# Patient Record
Sex: FEMALE | Race: WHITE | NOT HISPANIC OR LATINO | Employment: FULL TIME | URBAN - METROPOLITAN AREA
[De-identification: names, ages, dates, MRNs, and addresses within clinical notes are randomized per-mention and may not be internally consistent; named-entity substitution may affect disease eponyms.]

---

## 2017-04-06 ENCOUNTER — LAB (OUTPATIENT)
Dept: LAB | Facility: HOSPITAL | Age: 32
End: 2017-04-06

## 2017-04-06 ENCOUNTER — INITIAL PRENATAL (OUTPATIENT)
Dept: OBSTETRICS AND GYNECOLOGY | Facility: CLINIC | Age: 32
End: 2017-04-06

## 2017-04-06 VITALS
WEIGHT: 135.2 LBS | DIASTOLIC BLOOD PRESSURE: 68 MMHG | BODY MASS INDEX: 21.73 KG/M2 | SYSTOLIC BLOOD PRESSURE: 104 MMHG | HEIGHT: 66 IN

## 2017-04-06 DIAGNOSIS — Z3A.01 LESS THAN 8 WEEKS GESTATION OF PREGNANCY: ICD-10-CM

## 2017-04-06 DIAGNOSIS — Z3A.01 LESS THAN 8 WEEKS GESTATION OF PREGNANCY: Primary | ICD-10-CM

## 2017-04-06 LAB
ABO GROUP BLD: NORMAL
BASOPHILS # BLD AUTO: 0.03 10*3/MM3 (ref 0–0.2)
BASOPHILS NFR BLD AUTO: 0.4 % (ref 0–1)
BLD GP AB SCN SERPL QL: NEGATIVE
DEPRECATED RDW RBC AUTO: 43.5 FL (ref 37–54)
EOSINOPHIL # BLD AUTO: 0.15 10*3/MM3 (ref 0.1–0.3)
EOSINOPHIL NFR BLD AUTO: 2.1 % (ref 0–3)
ERYTHROCYTE [DISTWIDTH] IN BLOOD BY AUTOMATED COUNT: 12.6 % (ref 11.3–14.5)
EXTERNAL ABO GROUPING: NORMAL
EXTERNAL ANTIBODY SCREEN: NEGATIVE
EXTERNAL HEPATITIS B SURFACE ANTIGEN: NEGATIVE
EXTERNAL RH FACTOR: POSITIVE
EXTERNAL RUBELLA QUALITATIVE: NORMAL
EXTERNAL SYPHILIS RPR SCREEN: NORMAL
HBV SURFACE AG SERPL QL IA: NORMAL
HCT VFR BLD AUTO: 40.5 % (ref 34.5–44)
HGB BLD-MCNC: 13.7 G/DL (ref 11.5–15.5)
HIV1+2 AB SER QL: NORMAL
IMM GRANULOCYTES # BLD: 0.01 10*3/MM3 (ref 0–0.03)
IMM GRANULOCYTES NFR BLD: 0.1 % (ref 0–0.6)
LYMPHOCYTES # BLD AUTO: 2.39 10*3/MM3 (ref 0.6–4.8)
LYMPHOCYTES NFR BLD AUTO: 33.1 % (ref 24–44)
MCH RBC QN AUTO: 31.6 PG (ref 27–31)
MCHC RBC AUTO-ENTMCNC: 33.8 G/DL (ref 32–36)
MCV RBC AUTO: 93.5 FL (ref 80–99)
MONOCYTES # BLD AUTO: 0.61 10*3/MM3 (ref 0–1)
MONOCYTES NFR BLD AUTO: 8.5 % (ref 0–12)
NEUTROPHILS # BLD AUTO: 4.02 10*3/MM3 (ref 1.5–8.3)
NEUTROPHILS NFR BLD AUTO: 55.8 % (ref 41–71)
PLATELET # BLD AUTO: 274 10*3/MM3 (ref 150–450)
PMV BLD AUTO: 8.9 FL (ref 6–12)
RBC # BLD AUTO: 4.33 10*6/MM3 (ref 3.89–5.14)
RH BLD: POSITIVE
RUBV IGG SER QL: NORMAL
RUBV IGG SER-ACNC: 80.4 IU/ML
WBC NRBC COR # BLD: 7.21 10*3/MM3 (ref 3.5–10.8)

## 2017-04-06 PROCEDURE — 36415 COLL VENOUS BLD VENIPUNCTURE: CPT

## 2017-04-06 PROCEDURE — 80081 OBSTETRIC PANEL INC HIV TSTG: CPT | Performed by: OBSTETRICS & GYNECOLOGY

## 2017-04-06 PROCEDURE — 0501F PRENATAL FLOW SHEET: CPT | Performed by: OBSTETRICS & GYNECOLOGY

## 2017-04-07 LAB — RPR SER QL: NORMAL

## 2017-04-08 NOTE — PROGRESS NOTES
"Subjective   Liv Houser is a 31 y.o. female being seen today for her obstetrical visit. She is at 7w1d gestation. Patient reports no complaints. Fetal movement: None to date.    Menstrual History:  OB History      Para Term  AB TAB SAB Ectopic Multiple Living    1                  Menarche age: 13  Patient's last menstrual period was 2017 (exact date).       The following portions of the patient's history were reviewed and updated as appropriate: allergies, current medications, past family history, past medical history, past social history, past surgical history and problem list.    Review of Systems  A 10 point review of systems were negative     Objective     /68  Ht 66\" (167.6 cm)  Wt 135 lb 3.2 oz (61.3 kg)  LMP 2017 (Exact Date)  BMI 21.82 kg/m2  Uterine Size: Appropriate   Pelvic Exam:           Perineum: is normal                 Vulva: normal, Bartholin's, Urethra, [place]'s normal              Vagina:  normal mucosa, normal discharge               Cervix: no bleeding following Pap and nulliparous appearance               Uterus: normal size              Adnexa: normal adnexa      Bony Pelvis: gynecoid        Assessment      IUP at 7w1d       Plan     Problem list reviewed and updated.  Discussed the use of Vyvanse in pregnancy.  Patient with long history of ADHD and has been using the medicine for several years.  Will have patient stop the medicine for now and see how well she functions without it by the next visit.  If patient does not do well, will restart it.  Labs reviewed.  AFP3 discussed: requested.  Role of ultrasound in pregnancy discussed; fetal survey: requested.  Amniocentesis discussed: not indicated.  Follow up in 5 weeks.  Time 40 minutes.        MD TINY PerdomoA  "

## 2017-04-11 ENCOUNTER — TELEPHONE (OUTPATIENT)
Dept: OBSTETRICS AND GYNECOLOGY | Facility: CLINIC | Age: 32
End: 2017-04-11

## 2017-04-20 ENCOUNTER — TELEPHONE (OUTPATIENT)
Dept: OBSTETRICS AND GYNECOLOGY | Facility: CLINIC | Age: 32
End: 2017-04-20

## 2017-04-20 NOTE — TELEPHONE ENCOUNTER
----- Message from Elizabeth Bonilla sent at 4/20/2017  1:41 PM EDT -----  Wanted to ask Dr Montgomery if it is all right to start on welbutrin, her psychiatrist is going to prescribe it for her

## 2017-04-21 NOTE — TELEPHONE ENCOUNTER
Tried calling pt. Phone just rings.     Per Dr. Montgomery if she is comfortable with taking a C class drug he is ok with it. Concerns are  with draw but can discuss at next visit.

## 2017-05-11 ENCOUNTER — ROUTINE PRENATAL (OUTPATIENT)
Dept: OBSTETRICS AND GYNECOLOGY | Facility: CLINIC | Age: 32
End: 2017-05-11

## 2017-05-11 VITALS — BODY MASS INDEX: 21.95 KG/M2 | DIASTOLIC BLOOD PRESSURE: 74 MMHG | SYSTOLIC BLOOD PRESSURE: 108 MMHG | WEIGHT: 136 LBS

## 2017-05-11 DIAGNOSIS — Z3A.12 12 WEEKS GESTATION OF PREGNANCY: Primary | ICD-10-CM

## 2017-05-11 PROCEDURE — 0502F SUBSEQUENT PRENATAL CARE: CPT | Performed by: OBSTETRICS & GYNECOLOGY

## 2017-06-22 ENCOUNTER — ROUTINE PRENATAL (OUTPATIENT)
Dept: OBSTETRICS AND GYNECOLOGY | Facility: CLINIC | Age: 32
End: 2017-06-22

## 2017-06-22 VITALS — BODY MASS INDEX: 22.6 KG/M2 | SYSTOLIC BLOOD PRESSURE: 104 MMHG | DIASTOLIC BLOOD PRESSURE: 76 MMHG | WEIGHT: 140 LBS

## 2017-06-22 DIAGNOSIS — Z3A.18 18 WEEKS GESTATION OF PREGNANCY: Primary | ICD-10-CM

## 2017-06-22 PROCEDURE — 0502F SUBSEQUENT PRENATAL CARE: CPT | Performed by: OBSTETRICS & GYNECOLOGY

## 2017-06-22 RX ORDER — BUPROPION HYDROCHLORIDE 150 MG/1
150 TABLET ORAL DAILY
COMMUNITY
End: 2017-12-19 | Stop reason: ALTCHOICE

## 2017-06-22 NOTE — PROGRESS NOTES
Chief Complaint   Patient presents with   • Routine Prenatal Visit     No complaints       HPI: Liv is a  currently at 18w0d who today reports the following:  Contractions - No; Leaking - No; Vaginal bleeding -  No; Heartburn - No.    ROS:  GI: Nausea - No ; Constipation - No; Diarrhea - No    Neuro: Headache - No ; Visual change - No      EXAM:  Vitals: See prenatal flowsheet   Abdomen: See prenatal flowsheet   Urine glucose/protein: See prenatal flowsheet   Pelvic: See prenatal flowsheet     Lab Results   Component Value Date    ABO O 2017    RH Positive 2017    ABSCRN Negative 2017       MDM:  Impression: 1. Supervision of pregnancy   Tests done today: 1. U/S for anatomic screening - anatomy was not completely seen today   Topics discussed: 1. Continue with PNV's  2. Prenatal labs reviewed  3. none - she had no major complaints,questions or concerns   Tests next visit: U/S to complete the anatomic screening   Next visit: See prenatal flowsheet

## 2017-08-11 ENCOUNTER — ROUTINE PRENATAL (OUTPATIENT)
Dept: OBSTETRICS AND GYNECOLOGY | Facility: CLINIC | Age: 32
End: 2017-08-11

## 2017-08-11 VITALS — WEIGHT: 150 LBS | SYSTOLIC BLOOD PRESSURE: 124 MMHG | BODY MASS INDEX: 24.21 KG/M2 | DIASTOLIC BLOOD PRESSURE: 70 MMHG

## 2017-08-11 DIAGNOSIS — O44.22 PARTIAL PLACENTA PREVIA NOS OR WITHOUT HEMORRHAGE, SECOND TRIMESTER: Primary | ICD-10-CM

## 2017-08-11 PROCEDURE — 0502F SUBSEQUENT PRENATAL CARE: CPT | Performed by: OBSTETRICS & GYNECOLOGY

## 2017-08-11 NOTE — PROGRESS NOTES
Chief Complaint   Patient presents with   • Routine Prenatal Visit     Acid reflux       HPI: Liv is a  currently at 25w1d who today reports the following:  Contractions - No; Leaking - No; Vaginal bleeding -  No; Heartburn - No.    ROS:  GI: Nausea - No ; Constipation - No; Diarrhea - No    Neuro: Headache - No ; Visual change - No      EXAM:  Vitals: See prenatal flowsheet   Abdomen: See prenatal flowsheet   Urine glucose/protein: See prenatal flowsheet   Pelvic: See prenatal flowsheet     Lab Results   Component Value Date    ABO O 2017    RH Positive 2017    ABSCRN Negative 2017       MDM:  Impression: 1. Supervision of pregnancy   Tests done today: 1. U/S to complete the anatomic screening - Anatomy was seen completely today.  Placenta previa noted.   Topics discussed: 1. Continue with PNV's  2. Prenatal labs reviewed  3. Risks of placenta previa   Tests next visit: GCT  HgB  ABS  U/S for Placental evaluation   Next visit: See prenatal flowsheet

## 2017-09-01 ENCOUNTER — ROUTINE PRENATAL (OUTPATIENT)
Dept: OBSTETRICS AND GYNECOLOGY | Facility: CLINIC | Age: 32
End: 2017-09-01

## 2017-09-01 ENCOUNTER — LAB (OUTPATIENT)
Dept: LAB | Facility: HOSPITAL | Age: 32
End: 2017-09-01

## 2017-09-01 VITALS — WEIGHT: 148.6 LBS | DIASTOLIC BLOOD PRESSURE: 74 MMHG | BODY MASS INDEX: 23.98 KG/M2 | SYSTOLIC BLOOD PRESSURE: 106 MMHG

## 2017-09-01 DIAGNOSIS — O44.22 PARTIAL PLACENTA PREVIA NOS OR WITHOUT HEMORRHAGE, SECOND TRIMESTER: ICD-10-CM

## 2017-09-01 DIAGNOSIS — O44.41: Primary | ICD-10-CM

## 2017-09-01 LAB
BLD GP AB SCN SERPL QL: NEGATIVE
EXTERNAL GTT 1 HOUR: 104
GLUCOSE 1H P 100 G GLC PO SERPL-MCNC: 104 MG/DL (ref 65–199)
HCT VFR BLD AUTO: 36 % (ref 34.5–44)
HGB BLD-MCNC: 12.2 G/DL (ref 11.5–15.5)
TSH SERPL DL<=0.05 MIU/L-ACNC: 3.39 MIU/ML (ref 0.35–5.35)

## 2017-09-01 PROCEDURE — 36415 COLL VENOUS BLD VENIPUNCTURE: CPT | Performed by: OBSTETRICS & GYNECOLOGY

## 2017-09-01 PROCEDURE — 86850 RBC ANTIBODY SCREEN: CPT | Performed by: OBSTETRICS & GYNECOLOGY

## 2017-09-01 PROCEDURE — 0502F SUBSEQUENT PRENATAL CARE: CPT | Performed by: OBSTETRICS & GYNECOLOGY

## 2017-09-01 PROCEDURE — 85018 HEMOGLOBIN: CPT | Performed by: OBSTETRICS & GYNECOLOGY

## 2017-09-01 PROCEDURE — 84443 ASSAY THYROID STIM HORMONE: CPT | Performed by: OBSTETRICS & GYNECOLOGY

## 2017-09-01 PROCEDURE — 82950 GLUCOSE TEST: CPT | Performed by: OBSTETRICS & GYNECOLOGY

## 2017-09-01 PROCEDURE — 85014 HEMATOCRIT: CPT | Performed by: OBSTETRICS & GYNECOLOGY

## 2017-09-03 ENCOUNTER — RESULTS ENCOUNTER (OUTPATIENT)
Dept: OBSTETRICS AND GYNECOLOGY | Facility: CLINIC | Age: 32
End: 2017-09-03

## 2017-09-03 DIAGNOSIS — O44.22 PARTIAL PLACENTA PREVIA NOS OR WITHOUT HEMORRHAGE, SECOND TRIMESTER: ICD-10-CM

## 2017-09-07 ENCOUNTER — ROUTINE PRENATAL (OUTPATIENT)
Dept: OBSTETRICS AND GYNECOLOGY | Facility: CLINIC | Age: 32
End: 2017-09-07

## 2017-09-07 VITALS — SYSTOLIC BLOOD PRESSURE: 114 MMHG | BODY MASS INDEX: 24.69 KG/M2 | WEIGHT: 153 LBS | DIASTOLIC BLOOD PRESSURE: 70 MMHG

## 2017-09-07 DIAGNOSIS — O44.43 LOW LYING PLACENTA NOS OR WITHOUT HEMORRHAGE, THIRD TRIMESTER: Primary | ICD-10-CM

## 2017-09-07 PROCEDURE — 0502F SUBSEQUENT PRENATAL CARE: CPT | Performed by: OBSTETRICS & GYNECOLOGY

## 2017-09-15 NOTE — PROGRESS NOTES
Chief Complaint   Patient presents with   • Routine Prenatal Visit     No complaints       HPI: Liv is a  currently at 30w0d who today reports the following:  Contractions - No; Leaking - No; Vaginal bleeding -  No; Heartburn - No.    ROS:  GI: Nausea - No ; Constipation - No; Diarrhea - No    Neuro: Headache - No ; Visual change - No      EXAM:  Vitals: See prenatal flowsheet   Abdomen: See prenatal flowsheet   Urine glucose/protein: See prenatal flowsheet   Pelvic: See prenatal flowsheet     Lab Results   Component Value Date    ABO O 2017    RH Positive 2017    ABSCRN Negative 2017       MDM:  Impression: 1. Supervision of low risk pregnancy  2. Low-lying placenta   Tests done today: 1. none   Topics discussed: 1. Continue with PNV's  2. Prenatal labs reviewed  3. none - she had no major complaints,questions or concerns   Tests next visit: none   Next visit: See prenatal flowsheet

## 2017-09-15 NOTE — PROGRESS NOTES
Chief Complaint   Patient presents with   • Routine Prenatal Visit     No complaints       HPI: Liv is a  currently at 30w0d who today reports the following:  Contractions - No; Leaking - No; Vaginal bleeding -  No; Heartburn - No.    ROS:  GI: Nausea - No ; Constipation - No; Diarrhea - No    Neuro: Headache - No ; Visual change - No      EXAM:  Vitals: See prenatal flowsheet   Abdomen: See prenatal flowsheet   Urine glucose/protein: See prenatal flowsheet   Pelvic: See prenatal flowsheet     Lab Results   Component Value Date    ABO O 2017    RH Positive 2017    ABSCRN Negative 2017       MDM:  Impression: 1. Supervision of low risk pregnancy  2. Low-lying placenta   Tests done today: 1. U/S for Placental location   Topics discussed: 1. Continue with PNV's  2. Prenatal labs reviewed  3. none - she had no major complaints,questions or concerns   Tests next visit: none   Next visit: See prenatal flowsheet

## 2017-09-21 ENCOUNTER — ROUTINE PRENATAL (OUTPATIENT)
Dept: OBSTETRICS AND GYNECOLOGY | Facility: CLINIC | Age: 32
End: 2017-09-21

## 2017-09-21 VITALS — DIASTOLIC BLOOD PRESSURE: 80 MMHG | BODY MASS INDEX: 25.02 KG/M2 | WEIGHT: 155 LBS | SYSTOLIC BLOOD PRESSURE: 116 MMHG

## 2017-09-21 DIAGNOSIS — Z34.03 SUPERVISION OF NORMAL FIRST PREGNANCY IN THIRD TRIMESTER: Primary | ICD-10-CM

## 2017-09-21 PROCEDURE — 0502F SUBSEQUENT PRENATAL CARE: CPT | Performed by: OBSTETRICS & GYNECOLOGY

## 2017-09-21 NOTE — PROGRESS NOTES
Chief Complaint   Patient presents with   • Routine Prenatal Visit     No complaints       HPI: Liv is a  currently at 31w0d who today reports the following:  Contractions - No; Leaking - No; Vaginal bleeding -  No; Swelling of extremities - No.    ROS:  GI: Nausea - No; Constipation - No; Diarrhea - No    Neuro: Headache - No; Visual change - No      EXAM:  Vitals: See prenatal flowsheet   Abdomen: See prenatal flowsheet   Urine glucose/protein: See prenatal flowsheet   Pelvic: See prenatal flowsheet   MDM:  Impression: 1. Supervision of low risk pregnancy   Tests done today: 1. none   Topics discussed: 1. Continue with PNV's  2. Prenatal labs reviewed  3. none - she had no major complaints,questions or concerns   Tests next visit: none   Next visit: See prenatal flowsheet

## 2017-10-06 ENCOUNTER — ROUTINE PRENATAL (OUTPATIENT)
Dept: OBSTETRICS AND GYNECOLOGY | Facility: CLINIC | Age: 32
End: 2017-10-06

## 2017-10-06 VITALS — DIASTOLIC BLOOD PRESSURE: 70 MMHG | BODY MASS INDEX: 25.24 KG/M2 | WEIGHT: 156.4 LBS | SYSTOLIC BLOOD PRESSURE: 104 MMHG

## 2017-10-06 DIAGNOSIS — Z34.03 SUPERVISION OF NORMAL FIRST PREGNANCY IN THIRD TRIMESTER: Primary | ICD-10-CM

## 2017-10-06 PROCEDURE — 0502F SUBSEQUENT PRENATAL CARE: CPT | Performed by: OBSTETRICS & GYNECOLOGY

## 2017-10-06 NOTE — PROGRESS NOTES
Chief Complaint   Patient presents with   • Routine Prenatal Visit     No complaints       HPI: Liv is a  currently at 33w1d who today reports the following:  Contractions - No; Leaking - No; Vaginal bleeding -  No; Swelling of extremities - No.    ROS:  GI: Nausea - No; Constipation - No; Diarrhea - No    Neuro: Headache - No; Visual change - No      EXAM:  Vitals: See prenatal flowsheet   Abdomen: See prenatal flowsheet   Urine glucose/protein: See prenatal flowsheet   Pelvic: See prenatal flowsheet   MDM:  Impression: 1. Supervision of low risk pregnancy   Tests done today: 1. none   Topics discussed: 1. Continue with PNV's  2. Prenatal labs reviewed  3. none - she had no major complaints,questions or concerns   Tests next visit: GBS testing  U/S for EFW   Next visit: See prenatal flowsheet

## 2017-10-20 ENCOUNTER — ROUTINE PRENATAL (OUTPATIENT)
Dept: OBSTETRICS AND GYNECOLOGY | Facility: CLINIC | Age: 32
End: 2017-10-20

## 2017-10-20 VITALS — DIASTOLIC BLOOD PRESSURE: 80 MMHG | WEIGHT: 159.4 LBS | BODY MASS INDEX: 25.73 KG/M2 | SYSTOLIC BLOOD PRESSURE: 114 MMHG

## 2017-10-20 DIAGNOSIS — Z3A.35 35 WEEKS GESTATION OF PREGNANCY: Primary | ICD-10-CM

## 2017-10-20 LAB — EXTERNAL GROUP B STREP ANTIGEN: POSITIVE

## 2017-10-20 PROCEDURE — 0502F SUBSEQUENT PRENATAL CARE: CPT | Performed by: OBSTETRICS & GYNECOLOGY

## 2017-10-20 NOTE — PROGRESS NOTES
Chief Complaint   Patient presents with   • Routine Prenatal Visit     No complaints       HPI: Liv is a  currently at 35w1d who today reports the following:  Contractions - No; Leaking - No; Vaginal bleeding -  No; Swelling of extremities - No.    ROS:  GI: Nausea - No; Constipation - No; Diarrhea - No    Neuro: Headache - No; Visual change - No      EXAM:  Vitals: See prenatal flowsheet   Abdomen: See prenatal flowsheet   Urine glucose/protein: See prenatal flowsheet   Pelvic: See prenatal flowsheet   MDM:  Impression: 1. Supervision of low risk pregnancy   Tests done today: 1. none   Topics discussed: 1. Continue with PNV's  2. Prenatal labs reviewed  3. none - she had no major complaints,questions or concerns   Tests next visit: none   Next visit: See prenatal flowsheet

## 2017-10-27 ENCOUNTER — ROUTINE PRENATAL (OUTPATIENT)
Dept: OBSTETRICS AND GYNECOLOGY | Facility: CLINIC | Age: 32
End: 2017-10-27

## 2017-10-27 VITALS — WEIGHT: 160.8 LBS | DIASTOLIC BLOOD PRESSURE: 70 MMHG | SYSTOLIC BLOOD PRESSURE: 114 MMHG | BODY MASS INDEX: 25.95 KG/M2

## 2017-10-27 PROCEDURE — 0502F SUBSEQUENT PRENATAL CARE: CPT | Performed by: OBSTETRICS & GYNECOLOGY

## 2017-11-03 ENCOUNTER — ROUTINE PRENATAL (OUTPATIENT)
Dept: OBSTETRICS AND GYNECOLOGY | Facility: CLINIC | Age: 32
End: 2017-11-03

## 2017-11-03 VITALS — SYSTOLIC BLOOD PRESSURE: 112 MMHG | BODY MASS INDEX: 25.7 KG/M2 | DIASTOLIC BLOOD PRESSURE: 80 MMHG | WEIGHT: 159.2 LBS

## 2017-11-03 PROCEDURE — 0502F SUBSEQUENT PRENATAL CARE: CPT | Performed by: OBSTETRICS & GYNECOLOGY

## 2017-11-03 NOTE — PROGRESS NOTES
Chief Complaint   Patient presents with   • Routine Prenatal Visit     Contractions       HPI: Liv is a  currently at 37w1d who today reports the following:  Contractions - YES - but less than 4/hour AND no associated change in vaginal discharge; Leaking - No; Vaginal bleeding -  No; Swelling of extremities - No.    ROS:  GI: Nausea - No; Constipation - No; Diarrhea - No    Neuro: Headache - No; Visual change - No      EXAM:  Vitals: See prenatal flowsheet   Abdomen: See prenatal flowsheet   Urine glucose/protein: See prenatal flowsheet   Pelvic: See prenatal flowsheet   MDM:  Impression: 1. Supervision of low risk pregnancy   Tests done today: 1. none   Topics discussed: 1. Continue with PNV's  2. Prenatal labs reviewed  3. none - she had no major complaints,questions or concerns   Tests next visit: none   Next visit: See prenatal flowsheet

## 2017-11-04 ENCOUNTER — ANESTHESIA EVENT (OUTPATIENT)
Dept: LABOR AND DELIVERY | Facility: HOSPITAL | Age: 32
End: 2017-11-04

## 2017-11-04 ENCOUNTER — ANESTHESIA (OUTPATIENT)
Dept: LABOR AND DELIVERY | Facility: HOSPITAL | Age: 32
End: 2017-11-04

## 2017-11-04 ENCOUNTER — HOSPITAL ENCOUNTER (INPATIENT)
Facility: HOSPITAL | Age: 32
LOS: 3 days | Discharge: HOME | End: 2017-11-07
Attending: OBSTETRICS & GYNECOLOGY | Admitting: OBSTETRICS & GYNECOLOGY

## 2017-11-04 LAB
ABO GROUP BLD: NORMAL
AMPHET+METHAMPHET UR QL: POSITIVE
AMPHETAMINES UR QL: NEGATIVE
BARBITURATES UR QL SCN: NEGATIVE
BENZODIAZ UR QL SCN: NEGATIVE
BLD GP AB SCN SERPL QL: NEGATIVE
BUPRENORPHINE SERPL-MCNC: NEGATIVE NG/ML
CANNABINOIDS SERPL QL: NEGATIVE
COCAINE UR QL: NEGATIVE
DEPRECATED RDW RBC AUTO: 39 FL (ref 37–54)
ERYTHROCYTE [DISTWIDTH] IN BLOOD BY AUTOMATED COUNT: 12.2 % (ref 11.3–14.5)
HCT VFR BLD AUTO: 33.5 % (ref 34.5–44)
HGB BLD-MCNC: 11.3 G/DL (ref 11.5–15.5)
MCH RBC QN AUTO: 30.1 PG (ref 27–31)
MCHC RBC AUTO-ENTMCNC: 33.7 G/DL (ref 32–36)
MCV RBC AUTO: 89.1 FL (ref 80–99)
METHADONE UR QL SCN: NEGATIVE
OPIATES UR QL: NEGATIVE
OXYCODONE UR QL SCN: NEGATIVE
PCP UR QL SCN: NEGATIVE
PLATELET # BLD AUTO: 236 10*3/MM3 (ref 150–450)
PMV BLD AUTO: 8.8 FL (ref 6–12)
PROPOXYPH UR QL: NEGATIVE
RBC # BLD AUTO: 3.76 10*6/MM3 (ref 3.89–5.14)
RH BLD: POSITIVE
TRICYCLICS UR QL SCN: NEGATIVE
WBC NRBC COR # BLD: 9.3 10*3/MM3 (ref 3.5–10.8)

## 2017-11-04 PROCEDURE — S0260 H&P FOR SURGERY: HCPCS | Performed by: OBSTETRICS & GYNECOLOGY

## 2017-11-04 PROCEDURE — 86900 BLOOD TYPING SEROLOGIC ABO: CPT | Performed by: OBSTETRICS & GYNECOLOGY

## 2017-11-04 PROCEDURE — 25010000002 METOCLOPRAMIDE PER 10 MG: Performed by: ANESTHESIOLOGY

## 2017-11-04 PROCEDURE — 25010000003 MORPHINE PER 10 MG: Performed by: ANESTHESIOLOGY

## 2017-11-04 PROCEDURE — 80306 DRUG TEST PRSMV INSTRMNT: CPT | Performed by: OBSTETRICS & GYNECOLOGY

## 2017-11-04 PROCEDURE — 85027 COMPLETE CBC AUTOMATED: CPT | Performed by: OBSTETRICS & GYNECOLOGY

## 2017-11-04 PROCEDURE — 86850 RBC ANTIBODY SCREEN: CPT | Performed by: OBSTETRICS & GYNECOLOGY

## 2017-11-04 PROCEDURE — 86901 BLOOD TYPING SEROLOGIC RH(D): CPT | Performed by: OBSTETRICS & GYNECOLOGY

## 2017-11-04 PROCEDURE — 25010000002 GENTAMICIN PER 80 MG: Performed by: OBSTETRICS & GYNECOLOGY

## 2017-11-04 PROCEDURE — G0480 DRUG TEST DEF 1-7 CLASSES: HCPCS | Performed by: OBSTETRICS & GYNECOLOGY

## 2017-11-04 PROCEDURE — 25010000002 ONDANSETRON PER 1 MG: Performed by: ANESTHESIOLOGY

## 2017-11-04 RX ORDER — TRISODIUM CITRATE DIHYDRATE AND CITRIC ACID MONOHYDRATE 500; 334 MG/5ML; MG/5ML
30 SOLUTION ORAL ONCE
Status: COMPLETED | OUTPATIENT
Start: 2017-11-04 | End: 2017-11-04

## 2017-11-04 RX ORDER — FAMOTIDINE 10 MG/ML
20 INJECTION, SOLUTION INTRAVENOUS ONCE
Status: COMPLETED | OUTPATIENT
Start: 2017-11-04 | End: 2017-11-04

## 2017-11-04 RX ORDER — METOCLOPRAMIDE HYDROCHLORIDE 5 MG/ML
10 INJECTION INTRAMUSCULAR; INTRAVENOUS ONCE
Status: COMPLETED | OUTPATIENT
Start: 2017-11-04 | End: 2017-11-04

## 2017-11-04 RX ORDER — CARBOPROST TROMETHAMINE 250 UG/ML
250 INJECTION, SOLUTION INTRAMUSCULAR AS NEEDED
Status: CANCELLED | OUTPATIENT
Start: 2017-11-04

## 2017-11-04 RX ORDER — METHYLERGONOVINE MALEATE 0.2 MG/ML
200 INJECTION INTRAVENOUS AS NEEDED
Status: CANCELLED | OUTPATIENT
Start: 2017-11-04

## 2017-11-04 RX ORDER — OXYTOCIN 10 [USP'U]/ML
INJECTION, SOLUTION INTRAMUSCULAR; INTRAVENOUS AS NEEDED
Status: DISCONTINUED | OUTPATIENT
Start: 2017-11-04 | End: 2017-11-05 | Stop reason: SURG

## 2017-11-04 RX ORDER — SODIUM CHLORIDE, SODIUM LACTATE, POTASSIUM CHLORIDE, CALCIUM CHLORIDE 600; 310; 30; 20 MG/100ML; MG/100ML; MG/100ML; MG/100ML
125 INJECTION, SOLUTION INTRAVENOUS CONTINUOUS
Status: DISCONTINUED | OUTPATIENT
Start: 2017-11-04 | End: 2017-11-07 | Stop reason: HOSPADM

## 2017-11-04 RX ORDER — CLINDAMYCIN PHOSPHATE 900 MG/50ML
900 INJECTION INTRAVENOUS ONCE
Status: COMPLETED | OUTPATIENT
Start: 2017-11-04 | End: 2017-11-04

## 2017-11-04 RX ORDER — MISOPROSTOL 200 UG/1
800 TABLET ORAL AS NEEDED
Status: CANCELLED | OUTPATIENT
Start: 2017-11-04

## 2017-11-04 RX ORDER — OXYTOCIN/RINGER'S LACTATE 20/1000 ML
999 PLASTIC BAG, INJECTION (ML) INTRAVENOUS ONCE
Status: CANCELLED | OUTPATIENT
Start: 2017-11-04 | End: 2017-11-04

## 2017-11-04 RX ORDER — SODIUM CHLORIDE 0.9 % (FLUSH) 0.9 %
1-10 SYRINGE (ML) INJECTION AS NEEDED
Status: DISCONTINUED | OUTPATIENT
Start: 2017-11-04 | End: 2017-11-05 | Stop reason: HOSPADM

## 2017-11-04 RX ORDER — OXYTOCIN/RINGER'S LACTATE 20/1000 ML
125 PLASTIC BAG, INJECTION (ML) INTRAVENOUS CONTINUOUS PRN
Status: CANCELLED | OUTPATIENT
Start: 2017-11-04 | End: 2017-11-05

## 2017-11-04 RX ORDER — GENTAMICIN SULFATE 60 MG/50ML
2 INJECTION, SOLUTION INTRAVENOUS ONCE
Status: COMPLETED | OUTPATIENT
Start: 2017-11-04 | End: 2017-11-04

## 2017-11-04 RX ORDER — MORPHINE SULFATE 0.5 MG/ML
INJECTION, SOLUTION EPIDURAL; INTRATHECAL; INTRAVENOUS AS NEEDED
Status: DISCONTINUED | OUTPATIENT
Start: 2017-11-04 | End: 2017-11-05 | Stop reason: SURG

## 2017-11-04 RX ORDER — BUPIVACAINE HYDROCHLORIDE 7.5 MG/ML
INJECTION, SOLUTION INTRASPINAL AS NEEDED
Status: DISCONTINUED | OUTPATIENT
Start: 2017-11-04 | End: 2017-11-05 | Stop reason: SURG

## 2017-11-04 RX ORDER — FENTANYL CITRATE 50 UG/ML
INJECTION, SOLUTION INTRAMUSCULAR; INTRAVENOUS AS NEEDED
Status: DISCONTINUED | OUTPATIENT
Start: 2017-11-04 | End: 2017-11-05 | Stop reason: SURG

## 2017-11-04 RX ORDER — ONDANSETRON 2 MG/ML
INJECTION INTRAMUSCULAR; INTRAVENOUS AS NEEDED
Status: DISCONTINUED | OUTPATIENT
Start: 2017-11-04 | End: 2017-11-05 | Stop reason: SURG

## 2017-11-04 RX ORDER — LIDOCAINE HYDROCHLORIDE 10 MG/ML
5 INJECTION, SOLUTION INFILTRATION; PERINEURAL AS NEEDED
Status: DISCONTINUED | OUTPATIENT
Start: 2017-11-04 | End: 2017-11-05 | Stop reason: HOSPADM

## 2017-11-04 RX ADMIN — ONDANSETRON 4 MG: 2 INJECTION INTRAMUSCULAR; INTRAVENOUS at 23:35

## 2017-11-04 RX ADMIN — OXYTOCIN 40 UNITS: 10 INJECTION, SOLUTION INTRAMUSCULAR; INTRAVENOUS at 23:57

## 2017-11-04 RX ADMIN — METOCLOPRAMIDE 10 MG: 5 INJECTION, SOLUTION INTRAMUSCULAR; INTRAVENOUS at 22:11

## 2017-11-04 RX ADMIN — CLINDAMYCIN PHOSPHATE 900 MG: 18 INJECTION, SOLUTION INTRAVENOUS at 21:43

## 2017-11-04 RX ADMIN — BUPIVACAINE HYDROCHLORIDE IN DEXTROSE 1.6 ML: 7.5 INJECTION, SOLUTION SUBARACHNOID at 23:33

## 2017-11-04 RX ADMIN — SODIUM CHLORIDE, POTASSIUM CHLORIDE, SODIUM LACTATE AND CALCIUM CHLORIDE 125 ML/HR: 600; 310; 30; 20 INJECTION, SOLUTION INTRAVENOUS at 21:12

## 2017-11-04 RX ADMIN — FAMOTIDINE 20 MG: 10 INJECTION, SOLUTION INTRAVENOUS at 22:10

## 2017-11-04 RX ADMIN — FENTANYL CITRATE 15 MCG: 50 INJECTION, SOLUTION INTRAMUSCULAR; INTRAVENOUS at 23:33

## 2017-11-04 RX ADMIN — SODIUM CHLORIDE, POTASSIUM CHLORIDE, SODIUM LACTATE AND CALCIUM CHLORIDE: 600; 310; 30; 20 INJECTION, SOLUTION INTRAVENOUS at 23:43

## 2017-11-04 RX ADMIN — MORPHINE SULFATE 0.15 MG: 0.5 INJECTION, SOLUTION EPIDURAL; INTRATHECAL; INTRAVENOUS at 23:33

## 2017-11-04 RX ADMIN — SODIUM CITRATE AND CITRIC ACID MONOHYDRATE 30 ML: 500; 334 SOLUTION ORAL at 23:18

## 2017-11-04 RX ADMIN — SODIUM CHLORIDE, POTASSIUM CHLORIDE, SODIUM LACTATE AND CALCIUM CHLORIDE 1500 ML: 600; 310; 30; 20 INJECTION, SOLUTION INTRAVENOUS at 20:55

## 2017-11-04 RX ADMIN — GENTAMICIN SULFATE 120 MG: 60 INJECTION, SOLUTION INTRAVENOUS at 22:17

## 2017-11-05 PROCEDURE — 59510 CESAREAN DELIVERY: CPT | Performed by: OBSTETRICS & GYNECOLOGY

## 2017-11-05 PROCEDURE — 59025 FETAL NON-STRESS TEST: CPT

## 2017-11-05 PROCEDURE — 25010000002 FENTANYL CITRATE (PF) 100 MCG/2ML SOLUTION: Performed by: ANESTHESIOLOGY

## 2017-11-05 RX ORDER — ACETAMINOPHEN 325 MG/1
650 TABLET ORAL ONCE AS NEEDED
Status: DISCONTINUED | OUTPATIENT
Start: 2017-11-05 | End: 2017-11-07 | Stop reason: HOSPADM

## 2017-11-05 RX ORDER — ONDANSETRON 2 MG/ML
4 INJECTION INTRAMUSCULAR; INTRAVENOUS EVERY 6 HOURS PRN
Status: DISCONTINUED | OUTPATIENT
Start: 2017-11-05 | End: 2017-11-07 | Stop reason: HOSPADM

## 2017-11-05 RX ORDER — PROMETHAZINE HYDROCHLORIDE 12.5 MG/1
12.5 SUPPOSITORY RECTAL EVERY 6 HOURS PRN
Status: DISCONTINUED | OUTPATIENT
Start: 2017-11-05 | End: 2017-11-07 | Stop reason: HOSPADM

## 2017-11-05 RX ORDER — SIMETHICONE 80 MG
80 TABLET,CHEWABLE ORAL 4 TIMES DAILY PRN
Status: DISCONTINUED | OUTPATIENT
Start: 2017-11-05 | End: 2017-11-07 | Stop reason: HOSPADM

## 2017-11-05 RX ORDER — LANOLIN 100 %
OINTMENT (GRAM) TOPICAL
Status: DISCONTINUED | OUTPATIENT
Start: 2017-11-05 | End: 2017-11-07 | Stop reason: HOSPADM

## 2017-11-05 RX ORDER — DOCUSATE SODIUM 100 MG/1
100 CAPSULE, LIQUID FILLED ORAL 2 TIMES DAILY PRN
Status: DISCONTINUED | OUTPATIENT
Start: 2017-11-05 | End: 2017-11-07 | Stop reason: HOSPADM

## 2017-11-05 RX ORDER — DIPHENHYDRAMINE HCL 25 MG
25 CAPSULE ORAL EVERY 4 HOURS PRN
Status: DISCONTINUED | OUTPATIENT
Start: 2017-11-05 | End: 2017-11-07 | Stop reason: HOSPADM

## 2017-11-05 RX ORDER — HYDROCODONE BITARTRATE AND ACETAMINOPHEN 7.5; 325 MG/1; MG/1
1 TABLET ORAL EVERY 4 HOURS PRN
Status: DISCONTINUED | OUTPATIENT
Start: 2017-11-05 | End: 2017-11-07 | Stop reason: HOSPADM

## 2017-11-05 RX ORDER — NALOXONE HCL 0.4 MG/ML
0.4 VIAL (ML) INJECTION
Status: ACTIVE | OUTPATIENT
Start: 2017-11-05 | End: 2017-11-06

## 2017-11-05 RX ORDER — OXYCODONE HYDROCHLORIDE AND ACETAMINOPHEN 5; 325 MG/1; MG/1
2 TABLET ORAL ONCE AS NEEDED
Status: ACTIVE | OUTPATIENT
Start: 2017-11-05 | End: 2017-11-06

## 2017-11-05 RX ORDER — KETOROLAC TROMETHAMINE 30 MG/ML
30 INJECTION, SOLUTION INTRAMUSCULAR; INTRAVENOUS EVERY 6 HOURS
Status: DISCONTINUED | OUTPATIENT
Start: 2017-11-05 | End: 2017-11-06

## 2017-11-05 RX ORDER — ONDANSETRON 4 MG/1
4 TABLET, FILM COATED ORAL EVERY 6 HOURS PRN
Status: DISCONTINUED | OUTPATIENT
Start: 2017-11-05 | End: 2017-11-07 | Stop reason: HOSPADM

## 2017-11-05 RX ORDER — PROMETHAZINE HYDROCHLORIDE 25 MG/ML
12.5 INJECTION, SOLUTION INTRAMUSCULAR; INTRAVENOUS EVERY 6 HOURS PRN
Status: DISCONTINUED | OUTPATIENT
Start: 2017-11-05 | End: 2017-11-07 | Stop reason: HOSPADM

## 2017-11-05 RX ORDER — IBUPROFEN 600 MG/1
600 TABLET ORAL ONCE AS NEEDED
Status: COMPLETED | OUTPATIENT
Start: 2017-11-05 | End: 2017-11-05

## 2017-11-05 RX ORDER — DEXTROSE, SODIUM CHLORIDE, SODIUM LACTATE, POTASSIUM CHLORIDE, AND CALCIUM CHLORIDE 5; .6; .31; .03; .02 G/100ML; G/100ML; G/100ML; G/100ML; G/100ML
125 INJECTION, SOLUTION INTRAVENOUS CONTINUOUS
Status: DISCONTINUED | OUTPATIENT
Start: 2017-11-05 | End: 2017-11-07 | Stop reason: HOSPADM

## 2017-11-05 RX ORDER — IBUPROFEN 600 MG/1
600 TABLET ORAL EVERY 6 HOURS PRN
Status: DISCONTINUED | OUTPATIENT
Start: 2017-11-05 | End: 2017-11-07 | Stop reason: HOSPADM

## 2017-11-05 RX ORDER — HYDROXYZINE HYDROCHLORIDE 25 MG/1
50 TABLET, FILM COATED ORAL EVERY 6 HOURS PRN
Status: DISCONTINUED | OUTPATIENT
Start: 2017-11-05 | End: 2017-11-07 | Stop reason: HOSPADM

## 2017-11-05 RX ORDER — METOCLOPRAMIDE HYDROCHLORIDE 5 MG/ML
10 INJECTION INTRAMUSCULAR; INTRAVENOUS ONCE AS NEEDED
Status: DISCONTINUED | OUTPATIENT
Start: 2017-11-05 | End: 2017-11-07 | Stop reason: HOSPADM

## 2017-11-05 RX ORDER — HYDROCODONE BITARTRATE AND ACETAMINOPHEN 10; 325 MG/1; MG/1
1 TABLET ORAL EVERY 4 HOURS PRN
Status: DISCONTINUED | OUTPATIENT
Start: 2017-11-05 | End: 2017-11-07 | Stop reason: HOSPADM

## 2017-11-05 RX ORDER — HYDROMORPHONE HYDROCHLORIDE 1 MG/ML
0.5 INJECTION, SOLUTION INTRAMUSCULAR; INTRAVENOUS; SUBCUTANEOUS
Status: ACTIVE | OUTPATIENT
Start: 2017-11-05 | End: 2017-11-06

## 2017-11-05 RX ORDER — PROMETHAZINE HYDROCHLORIDE 25 MG/1
25 TABLET ORAL EVERY 6 HOURS PRN
Status: DISCONTINUED | OUTPATIENT
Start: 2017-11-05 | End: 2017-11-07 | Stop reason: HOSPADM

## 2017-11-05 RX ORDER — DIPHENHYDRAMINE HYDROCHLORIDE 50 MG/ML
25 INJECTION INTRAMUSCULAR; INTRAVENOUS EVERY 4 HOURS PRN
Status: DISCONTINUED | OUTPATIENT
Start: 2017-11-05 | End: 2017-11-07 | Stop reason: HOSPADM

## 2017-11-05 RX ORDER — MORPHINE SULFATE 2 MG/ML
2 INJECTION, SOLUTION INTRAMUSCULAR; INTRAVENOUS
Status: ACTIVE | OUTPATIENT
Start: 2017-11-05 | End: 2017-11-06

## 2017-11-05 RX ORDER — ONDANSETRON 2 MG/ML
4 INJECTION INTRAMUSCULAR; INTRAVENOUS ONCE
Status: DISCONTINUED | OUTPATIENT
Start: 2017-11-05 | End: 2017-11-07 | Stop reason: HOSPADM

## 2017-11-05 RX ADMIN — OXYTOCIN 40 UNITS: 10 INJECTION, SOLUTION INTRAMUSCULAR; INTRAVENOUS at 00:15

## 2017-11-05 RX ADMIN — SIMETHICONE CHEW TAB 80 MG 80 MG: 80 TABLET ORAL at 11:51

## 2017-11-05 RX ADMIN — IBUPROFEN 600 MG: 600 TABLET, FILM COATED ORAL at 17:49

## 2017-11-05 RX ADMIN — SIMETHICONE CHEW TAB 80 MG 80 MG: 80 TABLET ORAL at 07:33

## 2017-11-05 RX ADMIN — IBUPROFEN 600 MG: 600 TABLET ORAL at 06:19

## 2017-11-05 RX ADMIN — HYDROCODONE BITARTRATE AND ACETAMINOPHEN 1 TABLET: 7.5; 325 TABLET ORAL at 11:51

## 2017-11-05 RX ADMIN — HYDROCODONE BITARTRATE AND ACETAMINOPHEN 1 TABLET: 7.5; 325 TABLET ORAL at 07:33

## 2017-11-05 RX ADMIN — SIMETHICONE CHEW TAB 80 MG 80 MG: 80 TABLET ORAL at 22:22

## 2017-11-05 RX ADMIN — FENTANYL CITRATE 50 MCG: 50 INJECTION, SOLUTION INTRAMUSCULAR; INTRAVENOUS at 00:10

## 2017-11-05 RX ADMIN — SIMETHICONE CHEW TAB 80 MG 80 MG: 80 TABLET ORAL at 18:05

## 2017-11-05 RX ADMIN — IBUPROFEN 600 MG: 600 TABLET ORAL at 11:59

## 2017-11-05 RX ADMIN — HYDROCODONE BITARTRATE AND ACETAMINOPHEN 1 TABLET: 7.5; 325 TABLET ORAL at 18:05

## 2017-11-05 RX ADMIN — HYDROCODONE BITARTRATE AND ACETAMINOPHEN 1 TABLET: 7.5; 325 TABLET ORAL at 22:22

## 2017-11-05 NOTE — OP NOTE
BH Jane Houser  : 1985  MRN: [id number]  CSN: [id number]     Section Operative Note    Pre-Operative Dx:   1. Intrauterine pregnancy at 37w3d weeks  2. Labor with SROM   3. Malpresentation: breech      Postoperative dx:    1. Intrauterine pregnancy at 37w3d weeks  2. Labor with SROM 3.   Malpresentation: breech        Procedure: Primary  (LTCS)  - 1 layer closure       Surgeon: Ag Montgomery MD   Assistant: Nicky Garcia       Anesthesia: Spinal        EBL: 800 mls.   IV Fluids: 1800 mls.   UOP: 200 mls.     Antibiotics: clindamycin 900 mgs and gentamycin 300 mgs     Infant:      Name:  Ronnie      Gender: male  infant    Weight: 6 lb 13.4 oz (3.101 kg)     Apgars: 8   @ 1 minute / 9   @ 5 minutes    Cord gases: Venous:  @BABYNOHDR(BRIEFLAB, PHCVEN, BECVEN)@     Arterial:  @BABYNOHDR(BRIEFLAB, PHCART, BECART)@     Procedure Details:   After the patient was adequately anesthetized, she was sterilely prepped and draped in the dorsal supine left lateral tilt position. A Pfannenstiel incision was created sharply with the knife. It was carried down to the fascia with the Bovie.  The fascia was cut transversely with the knife and extended in a curvilinear fashion with scissors. The fascia was freed from its midline insertion superiorly and inferiorly. Rectus muscles were  in the midline. The peritoneum was sharply entered and a bladder flap was sharply created. The lower uterine segment was scored transversely with the knife. Clear amniotic fluid was seen. The infant was delivered atraumatically from a jermaine breech presentation. The mouth and nose were bulb suctioned. The infant was handed to the delivery team which was in attendance. The placenta was spontaneously extracted. The uterus was exteriorized and wiped free of debris and clot. The uterine incision was closed with #0 monocryl in a continuous running locking fashion. The bladder flap was  reapproximated.    The uterus was returned to the abdomen. The paracolic gutters were cleared of debris and clot. Parietal peritoneum was closed with 3-0 Monocryl. The fascia was closed with 0 PDS sewing from either corner, tying in the midline and burying all knots. The subcutaneous tissue was copiously irrigated. Ladan's fascia was closed with 3-0 plain gut and the skin was closed with 4-0 Monocryl subcuticularly. Skin glue was applied.  All counts were correct.         Complications:   None      Disposition:   Mother to Mother Baby/Postpartum  in stable condition currently.   Baby to NBN  in stable condition currently.       Ag Montgomery MD  11/5/2017  1:04 AM

## 2017-11-05 NOTE — ANESTHESIA POSTPROCEDURE EVALUATION
Patient: Liv Houser    Procedure Summary     Date Anesthesia Start Anesthesia Stop Room / Location    17 [id number] 0056 (17)  [facility] /  [facility]       Procedure Diagnosis Surgeon Provider     SECTION PRIMARY (N/A Abdomen) Breech presentation, single or unspecified fetus  (Breech presentation, single or unspecified fetus [O32.1XX0]) MD Chuck Simons MD          Anesthesia Type: epidural  Last vitals  BP   146/89 (17)   Temp   98.5 °F (36.9 °C) (17)   Pulse   85 (17)   Resp   18 (17)     SpO2      98     Post Anesthesia Care and Evaluation    Patient location during evaluation: bedside  Patient participation: complete - patient participated  Level of consciousness: awake and alert  Pain score: 0  Pain management: adequate  Airway patency: patent  Anesthetic complications: No anesthetic complications    Cardiovascular status: acceptable  Respiratory status: acceptable  Hydration status: acceptable

## 2017-11-05 NOTE — NURSING NOTE
Delivery Team RN notified per phone of patient's admission and status (including EDC, breech presentation, SROM, GBS +, plan of care).

## 2017-11-05 NOTE — ANESTHESIA PROCEDURE NOTES
Spinal Block    Patient location during procedure: OR  Start Time: 11/4/2017 11:30 PM  Indication:at surgeon's request  Performed By  Anesthesiologist: RHONA GAVIRIA  Preanesthetic Checklist  Completed: patient identified, site marked, surgical consent, pre-op evaluation, timeout performed, IV checked, risks and benefits discussed and monitors and equipment checked  Spinal Block Prep:  Patient Position:sitting  Sterile Tech:cap, gloves, sterile barriers and mask  Prep:alcohol swabs  Patient Monitoring:EKG, continuous pulse oximetry and blood pressure monitoring  Spinal Block Procedure  Approach:midline  Guidance:palpation technique  Location:L2-L3  Needle Type:Catrachita  Needle Gauge:25 G  Placement of Spinal needle event:cerebrospinal fluid aspirated  Paresthesia: no  Fluid Appearance:clear  Post Assessment  Patient Tolerance:patient tolerated the procedure well with no apparent complications  Complications no

## 2017-11-05 NOTE — NURSING NOTE
1:45 - Chux changed. Daphne care provided. Clean gown applied.    2:00 - Pt transferred to [facility]  via stretcher accompanied by RN, OR Tech, and spouse. Infant transfer with pt.

## 2017-11-05 NOTE — PLAN OF CARE
Problem: Patient Care Overview (Adult)  Goal: Plan of Care Review  Outcome: Ongoing (interventions implemented as appropriate)    17 0652   Coping/Psychosocial Response Interventions   Plan Of Care Reviewed With patient   Patient Care Overview   Progress improving   Outcome Evaluation   Outcome Summary/Follow up Plan VSS. U/2, firm, light bleeding. Breastfeeding. Pain controlled with meds.       Goal: Adult Individualization and Mutuality  Outcome: Ongoing (interventions implemented as appropriate)  Goal: Discharge Needs Assessment  Outcome: Ongoing (interventions implemented as appropriate)    Problem: Postpartum ( Delivery) (Adult)  Goal: Signs and Symptoms of Listed Potential Problems Will be Absent or Manageable (Postpartum)  Outcome: Ongoing (interventions implemented as appropriate)    Problem: Breastfeeding (Adult,NICU,[place],Obstetrics,Pediatric)  Goal: Signs and Symptoms of Listed Potential Problems Will be Absent or Manageable (Breastfeeding)  Outcome: Ongoing (interventions implemented as appropriate)

## 2017-11-05 NOTE — LACTATION NOTE
This note was copied from a baby's chart.  Mom reports baby is latching and nursing well.  Just got done feeding baby.  Teaching done, information given.  Encouraged mom to call out for assistance and position/latch check.     11/05/17 1250   Maternal Information   Date of Referral 11/05/17   Person Making Referral other (see comments)  (courtesy)   Maternal Infant Feeding   Maternal Emotional State relaxed   Previous Breastfeeding History no   Equipment Type/Education   Breast Pump Type double electric, personal

## 2017-11-05 NOTE — ANESTHESIA POSTPROCEDURE EVALUATION
Patient: iLv Houser    Procedure Summary     Date Anesthesia Start Anesthesia Stop Room / Location    17 2324 0056 (17)  [facility]       Procedure Diagnosis Surgeon Provider     SECTION PRIMARY (N/A Abdomen) Breech presentation, single or unspecified fetus  (Breech presentation, single or unspecified fetus [O32.1XX0]) MD Chuck Simons MD          Anesthesia Type: epidural  Last vitals  BP   105/57 (17 0800)   Temp   98.3 °F (36.8 °C) (17 0800)   Pulse   64 (17 0800)   Resp   14 (17 0800)     SpO2   100 % (17 0155)     Post Anesthesia Care and Evaluation    Patient location during evaluation: bedside  Patient participation: complete - patient participated  Level of consciousness: awake  Pain score: 0  Pain management: satisfactory to patient  Airway patency: patent  Anesthetic complications: No anesthetic complications    Cardiovascular status: acceptable  Respiratory status: acceptable  Hydration status: acceptable

## 2017-11-05 NOTE — PLAN OF CARE
Problem: Patient Care Overview (Adult)  Goal: Plan of Care Review  Outcome: Ongoing (interventions implemented as appropriate)    Problem: Breastfeeding (Adult,NICU,[place],Obstetrics,Pediatric)  Goal: Signs and Symptoms of Listed Potential Problems Will be Absent or Manageable (Breastfeeding)  Outcome: Ongoing (interventions implemented as appropriate)

## 2017-11-05 NOTE — H&P
Jane  Obstetric History and Physical    Chief Complaint   Patient presents with   • Rupture of Membranes     17 @ 1930       Subjective     Patient is a 32 y.o. female  currently at 37w2d, who presents with Spontaneous rupture of membranes approximately one hour ago.    Her prenatal care is complicated by  malpresentation  breech.  Her previous obstetric/gynecological history is noted for is non-contributory.    The following portions of the patients history were reviewed and updated as appropriate: current medications, allergies, past medical history, past surgical history, past family history, past social history and problem list .       Prenatal Information:  Prenatal Results         Initial Prenatal Labs Ref. Range Date Time   Hemoglobin  13.7 g/dL 11.5 - 15.5 g/dL 17 1303   Hematocrit  40.5 % 34.5 - 44.0 % 17 1303   Platelets  274 10*3/mm3 150 - 450 10*3/mm3 17 1303   Rubella IgG  80.4 IU/mL IU/mL 17 1303      Immune  Immune 17 1303   Hepatitis B SAg  Non-Reactive  Non-Reactive 17 1303   Hepatitis C Ab       RPR  Non-Reactive  Non-Reactive 17 1303   ABO  O   17 1303   Rh  Positive   17 1303   Antibody Screen  Negative   17 1303   HIV  Non-Reactive  Non-Reactive 17 1303   Urine Culture       Gonorrhea       Chlamydia       TSH  3.388 mIU/mL 0.350 - 5.350 mIU/mL 17 1547   2nd and 3rd Trimester Ref. Range Date Time   Hemoglobin (repeated)  12.2 g/dL 11.5 - 15.5 g/dL 17 1547   Hematocrit (repeated)  36.0 % 34.5 - 44.0 % 17 1547   GCT  104 mg/dL 65 - 199 mg/dL 17 1547   Antibody Screen (repeated)  Negative   17 1547   GTT Fasting       GTT 1 Hr       GTT 2 Hr       GTT 3 Hr       Group B Strep       Drug Screening Ref. Range Date Time   Amphetamine Screen       Barbiturate Screen       Benzodiazepine Screen       Methadone Screen       Phencyclidine Screen       Opiates Screen       THC Screen        Cocaine Screen       Propoxyphene Screen       Buprenorphine Screen       Methamphetamine Screen       Oxycodone Screen       Tryicyclic Antidepressants Screen       Other (Risk screening) Ref. Range Date Time   Varicella IgG       Parvovirus IgG       CMV IgG       Cystic Fibrosis       Hemoglobin electrophoresis       NIPT       MSAFP-4       AFP (for NTD only)              Legend: ^: Historical            View all results for this pregnancy             Past OB History:     Obstetric History       T0      L0     SAB0   TAB0   Ectopic0   Multiple0   Live Births0       # Outcome Date GA Lbr Pancho/2nd Weight Sex Delivery Anes PTL Lv   1 Current                   Past Medical History: Past Medical History:   Diagnosis Date   • ADHD (attention deficit hyperactivity disorder)    • History of abnormal cervical Pap smear       Past Surgical History Past Surgical History:   Procedure Laterality Date   • CERVICAL CONIZATION, LEEP  2010   • WISDOM TOOTH EXTRACTION        Family History: Family History   Problem Relation Age of Onset   • Breast cancer Maternal Aunt    • Hyperlipidemia Father    • Hypertension Mother    • Diabetes Paternal Grandmother    • Diabetes Maternal Grandmother    • Stroke Maternal Grandfather       Social History:  reports that she has never smoked. She has never used smokeless tobacco.   reports that she does not drink alcohol.   reports that she does not use illicit drugs.        General ROS: Pertinent items are noted in HPI    Objective       Vital Signs Range for the last 24 hours  Temperature: Temp:  [98.5 °F (36.9 °C)] 98.5 °F (36.9 °C)   Temp Source: Temp src: Axillary   BP: BP: (145)/(98) 145/98   Pulse: Heart Rate:  [71] 71   Respirations: Resp:  [18] 18   SPO2:     O2 Amount (l/min):     O2 Devices     Weight: Weight:  [155 lb (70.3 kg)] 155 lb (70.3 kg)     Physical Examination: General appearance - alert, well appearing, and in moderate distress and oriented to person,  place, and time  Mental status - alert, oriented to person, place, and time, normal mood, behavior, speech, dress, motor activity, and thought processes  Chest - clear to auscultation, no wheezes, rales or rhonchi, symmetric air entry  Heart - normal rate, regular rhythm, normal S1, S2, no murmurs, rubs, clicks or gallops  Abdomen - soft, nontender, nondistended, no masses or organomegaly    Presentation: Breech    Cervix: Exam by:     Dilation:     Effacement:     Station:         Fetal Heart Rate Assessment   Method:     Beats/min:     Baseline:     Varibility:     Accels:     Decels:     Tracing Category:       Uterine Assessment   Method:     Frequency (min):     Ctx Count in 10 min:     Duration:     Intensity:     Intensity by IUPC:     Resting Tone:     Resting Tone by IUPC:     L[place] Units:       Laboratory Results: GBS unknown  Radiology Review: Breech on ultrasound  Other Studies: N/A    Assessment/Plan     Principal Problem:    Breech presentation, no version        Assessment:  1.  Intrauterine pregnancy at 37w2d weeks gestation with reactive, reassuring fetal status.    2.  labor  with ROM  3.  Obstetrical history significant for is non-contributory.  4.  GBS status: No results found for: GBSANTIGEN    Plan:  1.   section for breech presentation  2. Plan of care has been reviewed with patient and   3.  Risks, benefits of treatment plan have been discussed.  4.  All questions have been answered.  5.  Today I discussed with Liv the risks of her upcoming surgical procedure. Risks including intraoperative bleeding, infection at the site of surgery, damage to the adjacent surrounding organs, catheter introduced urinary tract infections and the small risk for deep vein thrombosis were all explained. Additionally, the small risk for reoperation in the event of unanticipated bleeding or surgical injury was discussed.  All of her questions were answered fully.  She has a very clear  understanding of the preoperative surgical indications and the nature of the surgery for which she is scheduled.          Ag Montgomery MD MBA  11/4/2017  9:04 PM

## 2017-11-05 NOTE — ANESTHESIA PREPROCEDURE EVALUATION
Anesthesia Evaluation     Patient summary reviewed and Nursing notes reviewed   NPO Solid Status: > 2 hours  NPO Liquid Status: > 2 hours     Airway   Mallampati: I  TM distance: <3 FB  Neck ROM: full  no difficulty expected  Dental - normal exam     Pulmonary - negative pulmonary ROS and normal exam   Cardiovascular - negative cardio ROS and normal exam        Neuro/Psych- negative ROS  GI/Hepatic/Renal/Endo - negative ROS     Musculoskeletal (-) negative ROS    Abdominal  - normal exam    Bowel sounds: normal.   Substance History - negative use     OB/GYN    (+) Pregnant,         Other                                        Anesthesia Plan    ASA 2 - emergent     epidural     Anesthetic plan and risks discussed with patient.  Use of blood products discussed with patient .   Plan discussed with attending.

## 2017-11-06 LAB
HCT VFR BLD AUTO: 29.2 % (ref 34.5–44)
HGB BLD-MCNC: 9.7 G/DL (ref 11.5–15.5)
REF LAB TEST METHOD: NORMAL

## 2017-11-06 PROCEDURE — 85014 HEMATOCRIT: CPT | Performed by: OBSTETRICS & GYNECOLOGY

## 2017-11-06 PROCEDURE — 85018 HEMOGLOBIN: CPT | Performed by: OBSTETRICS & GYNECOLOGY

## 2017-11-06 RX ADMIN — HYDROCODONE BITARTRATE AND ACETAMINOPHEN 1 TABLET: 7.5; 325 TABLET ORAL at 03:19

## 2017-11-06 RX ADMIN — HYDROCODONE BITARTRATE AND ACETAMINOPHEN 1 TABLET: 7.5; 325 TABLET ORAL at 17:24

## 2017-11-06 RX ADMIN — DOCUSATE SODIUM 100 MG: 100 CAPSULE, LIQUID FILLED ORAL at 08:36

## 2017-11-06 RX ADMIN — SIMETHICONE CHEW TAB 80 MG 80 MG: 80 TABLET ORAL at 17:24

## 2017-11-06 RX ADMIN — DOCUSATE SODIUM 100 MG: 100 CAPSULE, LIQUID FILLED ORAL at 17:24

## 2017-11-06 RX ADMIN — IBUPROFEN 600 MG: 600 TABLET, FILM COATED ORAL at 00:06

## 2017-11-06 RX ADMIN — IBUPROFEN 600 MG: 600 TABLET, FILM COATED ORAL at 15:34

## 2017-11-06 RX ADMIN — IBUPROFEN 600 MG: 600 TABLET, FILM COATED ORAL at 08:36

## 2017-11-06 RX ADMIN — SIMETHICONE CHEW TAB 80 MG 80 MG: 80 TABLET ORAL at 13:22

## 2017-11-06 RX ADMIN — HYDROCODONE BITARTRATE AND ACETAMINOPHEN 1 TABLET: 7.5; 325 TABLET ORAL at 08:36

## 2017-11-06 RX ADMIN — HYDROCODONE BITARTRATE AND ACETAMINOPHEN 1 TABLET: 7.5; 325 TABLET ORAL at 13:22

## 2017-11-06 RX ADMIN — SIMETHICONE CHEW TAB 80 MG 80 MG: 80 TABLET ORAL at 08:36

## 2017-11-06 NOTE — PLAN OF CARE
Problem: Patient Care Overview (Adult)  Goal: Plan of Care Review  Outcome: Ongoing (interventions implemented as appropriate)    17 1125   Coping/Psychosocial Response Interventions   Plan Of Care Reviewed With patient   Patient Care Overview   Progress improving       Goal: Adult Individualization and Mutuality  Outcome: Ongoing (interventions implemented as appropriate)  Goal: Discharge Needs Assessment  Outcome: Ongoing (interventions implemented as appropriate)    Problem: Postpartum ( Delivery) (Adult)  Goal: Signs and Symptoms of Listed Potential Problems Will be Absent or Manageable (Postpartum)  Outcome: Ongoing (interventions implemented as appropriate)    Problem: Breastfeeding (Adult,NICU,,Obstetrics,Pediatric)  Goal: Signs and Symptoms of Listed Potential Problems Will be Absent or Manageable (Breastfeeding)  Outcome: Ongoing (interventions implemented as appropriate)

## 2017-11-06 NOTE — PLAN OF CARE
Problem: Patient Care Overview (Adult)  Goal: Plan of Care Review  Outcome: Ongoing (interventions implemented as appropriate)    11/06/17 0985   Coping/Psychosocial Response Interventions   Plan Of Care Reviewed With patient   Patient Care Overview   Progress progress toward functional goals as expected   Outcome Evaluation   Outcome Summary/Follow up Plan Continue to feed on demand. Awkard with positioning. Changed positions for better support of baby's head.

## 2017-11-06 NOTE — CONSULTS
Continued Stay Note  DOMENIC Lara     Patient Name: Liv Houser  MRN: [id number]  Today's Date: 11/6/2017    Admit Date: 11/4/2017          Discharge Plan       11/06/17 0757    Case Management/Social Work Plan    Additional Comments Pt prescribed vyvanse for ADHD by Psychiatrist- stopped taking in early pregnancy and started on wellbutrin.               Discharge Codes     None            SUNNY Mendes

## 2017-11-06 NOTE — LACTATION NOTE
"   11/06/17 0945   Maternal Infant Assessment   Size Issue, Bilateral Breasts no   Shape, Bilateral Breasts round   Density, Bilateral Breasts soft   Nipples, Bilateral everted;graspable   Nipple Conditions, Bilateral intact   Additional Documentation (Latch) LATCH Score (Group)   LATCH Score   Latch 1-->repeated attempts, holds nipple in mouth, stimulate to suck   Audible Swallowing 1-->a few with stimulation   Type Of Nipple 2-->everted (after stimulation)   Comfort (Breast/Nipple) 2-->soft/nontender   Hold (Positioning) 1-->minimal assist, teach one side: mother does other, staff holds   Score (less than 7 for 2/more consecutive times, consult Lactation Consultant) 7   Maternal Infant Feeding   Previous Breastfeeding History no   Infant Positioning clutch/\"football\";cross-cradle   Latch Assistance yes   Feeding Infant   Feeding Readiness Cues rooting   Effective Latch During Feeding yes     "

## 2017-11-06 NOTE — PROGRESS NOTES
[facility]  Liv Houser  : 1985  MRN: [id number]  CSN: [id number]    Post-operative Day #1  Subjective   Her pain is well controlled. Vaginal bleeding is normal in amount. She is ambulating without difficulty. She is passing gas. She is voiding without difficulty. Her baby is doing well.     Objective     Min/max vitals past 24 hours:   Temp  Min: 98 °F (36.7 °C)  Max: 98.8 °F (37.1 °C)  BP  Min: 122/73  Max: 136/85  Pulse  Min: 56  Max: 65  Resp  Min: 16  Max: 16        General: well developed; well nourished  no acute distress   Abdomen: soft, non-tender; no masses  no umbilical or inginual hernias are present  no hepato-splenomegaly  incision is healing, clean, dry and intact   Pelvic: Not performed   Ext: Calves NT     Last 3 values     Results from last 7 days  Lab Units 17  0718 17  2058   WBC 10*3/mm3  --  9.30   HEMOGLOBIN g/dL 9.7* 11.3*   HEMATOCRIT % 29.2* 33.5*   PLATELETS 10*3/mm3  --  236     Lab Results   Component Value Date    RH Positive 2017    HEPBSAG Non-Reactive 2017          Assessment   1. POD #1 S/P Primary   - 1 layer closure     Plan   1. Continue routine post-operative care  2. Ambulate  3. Advance diet    Ag Montgomery MD

## 2017-11-06 NOTE — PROGRESS NOTES
Chief Complaint   Patient presents with   • Routine Prenatal Visit     Possible yeast infection: external itching       HPI: Liv is a  currently at 37w2d who today reports the following:  Contractions - No; Leaking - No; Vaginal bleeding -  No; Swelling of extremities - No.    ROS:  GI: Nausea - No; Constipation - No; Diarrhea - No    Neuro: Headache - No; Visual change - No      EXAM:  Vitals: See prenatal flowsheet   Abdomen: See prenatal flowsheet   Urine glucose/protein: See prenatal flowsheet   Pelvic: See prenatal flowsheet   MDM:  Impression: 1. Supervision of low risk pregnancy   Tests done today: 1. none   Topics discussed: 1. Continue with PNV's  2. Prenatal labs reviewed  3. none - she had no major complaints,questions or concerns   Tests next visit: none   Next visit: See prenatal flowsheet

## 2017-11-07 VITALS
WEIGHT: 155 LBS | HEIGHT: 67 IN | BODY MASS INDEX: 24.33 KG/M2 | SYSTOLIC BLOOD PRESSURE: 140 MMHG | RESPIRATION RATE: 16 BRPM | HEART RATE: 57 BPM | OXYGEN SATURATION: 100 % | DIASTOLIC BLOOD PRESSURE: 78 MMHG | TEMPERATURE: 97.7 F

## 2017-11-07 PROBLEM — Z34.03 SUPERVISION OF NORMAL FIRST PREGNANCY IN THIRD TRIMESTER: Status: RESOLVED | Noted: 2017-10-06 | Resolved: 2017-11-07

## 2017-11-07 RX ORDER — HYDROCODONE BITARTRATE AND IBUPROFEN 7.5; 2 MG/1; MG/1
1-2 TABLET, FILM COATED ORAL EVERY 8 HOURS PRN
Qty: 40 TABLET | Refills: 0 | Status: SHIPPED | OUTPATIENT
Start: 2017-11-07 | End: 2017-12-19

## 2017-11-07 RX ORDER — PSEUDOEPHEDRINE HCL 30 MG
100 TABLET ORAL 2 TIMES DAILY PRN
Qty: 60 CAPSULE | Refills: 2 | Status: SHIPPED | OUTPATIENT
Start: 2017-11-07 | End: 2018-05-15

## 2017-11-07 RX ORDER — LANOLIN 100 %
OINTMENT (GRAM) TOPICAL
Qty: 40 G | Refills: 3 | Status: SHIPPED | OUTPATIENT
Start: 2017-11-07 | End: 2018-05-15

## 2017-11-07 RX ADMIN — SIMETHICONE CHEW TAB 80 MG 80 MG: 80 TABLET ORAL at 07:41

## 2017-11-07 RX ADMIN — IBUPROFEN 600 MG: 600 TABLET, FILM COATED ORAL at 00:28

## 2017-11-07 RX ADMIN — DOCUSATE SODIUM 100 MG: 100 CAPSULE, LIQUID FILLED ORAL at 07:41

## 2017-11-07 RX ADMIN — HYDROCODONE BITARTRATE AND ACETAMINOPHEN 1 TABLET: 10; 325 TABLET ORAL at 00:26

## 2017-11-07 RX ADMIN — HYDROCODONE BITARTRATE AND ACETAMINOPHEN 1 TABLET: 10; 325 TABLET ORAL at 07:41

## 2017-11-07 RX ADMIN — IBUPROFEN 600 MG: 600 TABLET, FILM COATED ORAL at 06:17

## 2017-11-07 NOTE — LACTATION NOTE
"This note was copied from a baby's chart.     11/07/17 1020   Maternal Infant Assessment   Density, Right Breast filling   Infant Assessment   Sucking Reflex present   Rooting Reflex present   Swallow Reflex present   LATCH Score   Latch 2-->grasps breast, tongue down, lips flanged, rhythmic sucking   Audible Swallowing 2-->spontaneous and intermittent (24 hrs old)   Type Of Nipple 2-->everted (after stimulation)   Comfort (Breast/Nipple) 2-->soft/nontender   Hold (Positioning) 1-->minimal assist, teach one side: mother does other, staff holds   Score (less than 7 for 2/more consecutive times, consult Lactation Consultant) 9   Maternal Infant Feeding   Infant Positioning clutch/\"football\"   Signs of Milk Transfer audible swallow;infant jaw motion present   Comfort Measures Before/During Feeding infant position adjusted   Latch Assistance yes   Feeding Infant   Effective Latch During Feeding yes   Audible Swallow yes   Suck/Swallow Coordination present   milk in shield when baby pulled off, encouraged frequent feeding and stimulation while feeding, mom using shield, discussed weaning shield  "

## 2017-11-07 NOTE — DISCHARGE SUMMARY
Discharge Summary    [facility]  Livshailesh Houser  : 1985  MRN: [id number]  CSN: [id number]    Date of Admission: 2017   Date of Discharge:  2017   Delivering Physician: Ag Montgomery MD       Admission Diagnosis: 1. Pregnancy at 37w2d  2. Breech presentation  3. Active labor   Discharge Diagnosis: 1. Same as above plus  2. Pregnancy at 37w2d - delivered       Procedures: 1. Primary   - 1 layer closure     Hospital Course  See the completed operative report for details regarding antepartum course and delivery.    Her post-operative course was unremarkable.  On POD # 3 she felt like she ready ready for D/C.  She was evaluated by Dr. Montgomery who agreed she was able to be discharged to home.  She had no febrile morbidity. She had normal bowel and bladder function and was hemodynamically stable.  Her wound was healing well without obvious signs of infections.    Infant  male  fetus weighing 6 lb 13.4 oz (3.101 kg)   Apgars -  8  @ 1 minute /  9  @ 5 minutes.    Discharge labs  Lab Results   Component Value Date    WBC 9.30 2017    HGB 9.7 (L) 2017    HCT 29.2 (L) 2017     2017       Discharge Medications   Liv Houser   Home Medication Instructions [id number]    Printed on:17   Medication Information                      buPROPion XL (WELLBUTRIN XL) 150 MG 24 hr tablet  Take 150 mg by mouth Daily.             docusate sodium 100 MG capsule  Take 100 mg by mouth 2 (Two) Times a Day As Needed for Constipation.             HYDROcodone-ibuprofen (VICOPROFEN) 7.5-200 MG per tablet  Take 1-2 tablets by mouth Every 8 (Eight) Hours As Needed for Moderate Pain .             lanolin ointment  Apply  topically Every 1 (One) Hour As Needed for Dry Skin (nipple pain).             Prenatal Multivit-Min-Fe-FA (PRENATAL VITAMINS PO)  Take  by mouth.                 Discharge Disposition Home or Self Care   Condition on Discharge: good    Follow-up: 1 week with Dr. Montgomery for BP and incision check     Ag Montgomery MD  11/7/2017

## 2017-11-07 NOTE — PLAN OF CARE
Problem: Patient Care Overview (Adult)  Goal: Plan of Care Review  Outcome: Outcome(s) achieved Date Met:  17 0719   Outcome Evaluation   Outcome Summary/Follow up Plan vitals within normal, tolerating PO, light lochia, no s/s of infection       Goal: Adult Individualization and Mutuality  Outcome: Outcome(s) achieved Date Met:  17  Goal: Discharge Needs Assessment  Outcome: Outcome(s) achieved Date Met:  17    Problem: Postpartum ( Delivery) (Adult)  Goal: Signs and Symptoms of Listed Potential Problems Will be Absent or Manageable (Postpartum)  Outcome: Outcome(s) achieved Date Met:  17    Problem: Breastfeeding (Adult,NICU,[place],Obstetrics,Pediatric)  Goal: Signs and Symptoms of Listed Potential Problems Will be Absent or Manageable (Breastfeeding)  Outcome: Outcome(s) achieved Date Met:  17

## 2017-11-14 ENCOUNTER — POSTPARTUM VISIT (OUTPATIENT)
Dept: OBSTETRICS AND GYNECOLOGY | Facility: CLINIC | Age: 32
End: 2017-11-14

## 2017-11-14 ENCOUNTER — APPOINTMENT (OUTPATIENT)
Dept: PREADMISSION TESTING | Facility: HOSPITAL | Age: 32
End: 2017-11-14

## 2017-11-14 ENCOUNTER — APPOINTMENT (OUTPATIENT)
Dept: LAB | Facility: HOSPITAL | Age: 32
End: 2017-11-14
Attending: OBSTETRICS & GYNECOLOGY

## 2017-11-14 VITALS
BODY MASS INDEX: 21.94 KG/M2 | DIASTOLIC BLOOD PRESSURE: 100 MMHG | WEIGHT: 139.8 LBS | HEIGHT: 67 IN | RESPIRATION RATE: 14 BRPM | SYSTOLIC BLOOD PRESSURE: 142 MMHG

## 2017-11-14 DIAGNOSIS — R03.0 ELEVATED BLOOD PRESSURE READING: Primary | ICD-10-CM

## 2017-11-14 LAB
ALP SERPL-CCNC: 247 U/L (ref 25–100)
ALT SERPL W P-5'-P-CCNC: 17 U/L (ref 7–40)
AST SERPL-CCNC: 27 U/L (ref 0–33)
BILIRUB SERPL-MCNC: 0.6 MG/DL (ref 0.3–1.2)
CREAT BLD-MCNC: 0.7 MG/DL (ref 0.6–1.3)
LDH SERPL-CCNC: 253 U/L (ref 120–246)
URATE SERPL-MCNC: 4.5 MG/DL (ref 3.1–7.8)

## 2017-11-14 PROCEDURE — 82565 ASSAY OF CREATININE: CPT | Performed by: OBSTETRICS & GYNECOLOGY

## 2017-11-14 PROCEDURE — 0503F POSTPARTUM CARE VISIT: CPT | Performed by: OBSTETRICS & GYNECOLOGY

## 2017-11-14 PROCEDURE — 36415 COLL VENOUS BLD VENIPUNCTURE: CPT | Performed by: OBSTETRICS & GYNECOLOGY

## 2017-11-14 PROCEDURE — 84550 ASSAY OF BLOOD/URIC ACID: CPT | Performed by: OBSTETRICS & GYNECOLOGY

## 2017-11-14 PROCEDURE — 83615 LACTATE (LD) (LDH) ENZYME: CPT | Performed by: OBSTETRICS & GYNECOLOGY

## 2017-11-14 PROCEDURE — 82247 BILIRUBIN TOTAL: CPT | Performed by: OBSTETRICS & GYNECOLOGY

## 2017-11-14 PROCEDURE — 84460 ALANINE AMINO (ALT) (SGPT): CPT | Performed by: OBSTETRICS & GYNECOLOGY

## 2017-11-14 PROCEDURE — 84075 ASSAY ALKALINE PHOSPHATASE: CPT | Performed by: OBSTETRICS & GYNECOLOGY

## 2017-11-14 PROCEDURE — 84450 TRANSFERASE (AST) (SGOT): CPT | Performed by: OBSTETRICS & GYNECOLOGY

## 2017-11-14 RX ORDER — LABETALOL 100 MG/1
100 TABLET, FILM COATED ORAL 2 TIMES DAILY
Qty: 30 TABLET | Refills: 1 | Status: SHIPPED | OUTPATIENT
Start: 2017-11-14 | End: 2017-12-19

## 2017-11-14 RX ORDER — IBUPROFEN 400 MG/1
400 TABLET ORAL 2 TIMES DAILY
COMMUNITY
End: 2019-11-20

## 2017-11-16 NOTE — PROGRESS NOTES
"Subjective   Chief Complaint   Patient presents with   • Postpartum Care     Blood pressure check     Liv Houser is a 32 y.o. year old  presenting to be seen for her Blood pressure check visit.  Currently she reports no problems with eating, bowel movements, voiding, or wound drainage and pain is well controlled.    The pathology results from her procedure are in Liv's record and are benign.      OTHER COMPLAINTS:  Nothing else    The following portions of the patient's history were reviewed and updated as appropriate:current medications and allergies       Objective   /100  Resp 14  Ht 67\" (170.2 cm)  Wt 139 lb 12.8 oz (63.4 kg)  LMP 2017 (Exact Date)  Breastfeeding? Yes  BMI 21.9 kg/m2    General:  well developed; well nourished  no acute distress   Abdomen: soft, non-tender; no masses  no umbilical or inginual hernias are present  no hepato-splenomegaly  incision is healing, clean, dry, intact and without drainage   Pelvis: Not performed.          Assessment   1. S/P   2. Elevated blood pressure     Plan   1. Avoid tampons, douching and intercourse  2. Nothing per vagina still until 6 weeks after her procedure  3. PIH labs and 24-hour urine pending.  Will start patient on 100 mg labetalol daily for her elevated pressures  4. Follow up 2 week incision check 1 week    New Medications Ordered This Visit   Medications   • ibuprofen (ADVIL,MOTRIN) 400 MG tablet     Sig: Take 400 mg by mouth 2 (Two) Times a Day.   • labetalol (NORMODYNE) 100 MG tablet     Sig: Take 1 tablet by mouth 2 (Two) Times a Day.     Dispense:  30 tablet     Refill:  1          This note was electronically signed.    Ag Montgomery M.D. MAURICE  November 15, 2017  "

## 2017-11-17 ENCOUNTER — LAB (OUTPATIENT)
Dept: LAB | Facility: HOSPITAL | Age: 32
End: 2017-11-17

## 2017-11-17 DIAGNOSIS — R03.0 ELEVATED BLOOD PRESSURE READING: ICD-10-CM

## 2017-11-17 PROCEDURE — 84156 ASSAY OF PROTEIN URINE: CPT | Performed by: OBSTETRICS & GYNECOLOGY

## 2017-11-17 PROCEDURE — 81050 URINALYSIS VOLUME MEASURE: CPT | Performed by: OBSTETRICS & GYNECOLOGY

## 2017-11-18 LAB
COLLECT DURATION TIME UR: 24 HRS
PROT 24H UR-MRATE: 78 MG/24HOURS (ref 42–225)
PROT UR-MCNC: 4 MG/DL (ref 1–14)
SPECIMEN VOL 24H UR: 1950 ML

## 2017-11-21 ENCOUNTER — OFFICE VISIT (OUTPATIENT)
Dept: OBSTETRICS AND GYNECOLOGY | Facility: CLINIC | Age: 32
End: 2017-11-21

## 2017-11-21 VITALS
BODY MASS INDEX: 21.53 KG/M2 | DIASTOLIC BLOOD PRESSURE: 90 MMHG | WEIGHT: 137.2 LBS | HEIGHT: 67 IN | RESPIRATION RATE: 14 BRPM | SYSTOLIC BLOOD PRESSURE: 118 MMHG

## 2017-11-21 DIAGNOSIS — Z09 POSTOPERATIVE EXAMINATION: Primary | ICD-10-CM

## 2017-11-21 PROCEDURE — 0503F POSTPARTUM CARE VISIT: CPT | Performed by: OBSTETRICS & GYNECOLOGY

## 2017-11-22 NOTE — PROGRESS NOTES
"Subjective   Chief Complaint   Patient presents with   • Postpartum Care     Blood pressure check. Second check 100/80     Liv Houser is a 32 y.o. year old  presenting to be seen for her 2 week post-operative visit.  Currently she reports no problems with eating, bowel movements, voiding, or wound drainage and pain is well controlled.    The pathology results from her procedure are in Liv's record and are benign.      OTHER COMPLAINTS:  Nothing else    The following portions of the patient's history were reviewed and updated as appropriate:current medications and allergies       Objective   /90  Resp 14  Ht 67\" (170.2 cm)  Wt 137 lb 3.2 oz (62.2 kg)  Breastfeeding? No  BMI 21.49 kg/m2    General:  well developed; well nourished  no acute distress   Abdomen: soft, non-tender; no masses  no umbilical or inginual hernias are present  no hepato-splenomegaly  incision is healed, clean, dry and intact   Pelvis: Not performed.          Assessment   1. S/P      Plan   1. Nothing per vagina still until 6 weeks after her procedure  2. Follow up for 6 week postpartum check 4 weeks    No orders of the defined types were placed in this encounter.         This note was electronically signed.    Ag Montgomery M.D. MAURICE  2017  "

## 2017-12-19 ENCOUNTER — POSTPARTUM VISIT (OUTPATIENT)
Dept: OBSTETRICS AND GYNECOLOGY | Facility: CLINIC | Age: 32
End: 2017-12-19

## 2017-12-19 VITALS
SYSTOLIC BLOOD PRESSURE: 98 MMHG | RESPIRATION RATE: 14 BRPM | WEIGHT: 136.6 LBS | DIASTOLIC BLOOD PRESSURE: 70 MMHG | HEIGHT: 67 IN | BODY MASS INDEX: 21.44 KG/M2

## 2017-12-19 DIAGNOSIS — Z30.014 ENCOUNTER FOR INITIAL PRESCRIPTION OF INTRAUTERINE CONTRACEPTIVE DEVICE (IUD): ICD-10-CM

## 2017-12-19 PROCEDURE — 58300 INSERT INTRAUTERINE DEVICE: CPT | Performed by: OBSTETRICS & GYNECOLOGY

## 2017-12-19 PROCEDURE — 0503F POSTPARTUM CARE VISIT: CPT | Performed by: OBSTETRICS & GYNECOLOGY

## 2017-12-19 RX ORDER — COPPER 313.4 MG/1
INTRAUTERINE DEVICE INTRAUTERINE
Refills: 0 | COMMUNITY
Start: 2017-12-11 | End: 2019-06-10

## 2017-12-19 RX ORDER — COPPER 313.4 MG/1
INTRAUTERINE DEVICE INTRAUTERINE ONCE
Status: COMPLETED | OUTPATIENT
Start: 2017-12-19 | End: 2017-12-19

## 2017-12-19 RX ADMIN — COPPER: 313.4 INTRAUTERINE DEVICE INTRAUTERINE at 12:27

## 2017-12-19 NOTE — PROGRESS NOTES
"Subjective   Chief Complaint   Patient presents with   • Postpartum Care     No complaints     Liv Houser is a 32 y.o. year old  presenting to be seen for her postpartum visit.  She had a primary  (LTCS) delivery.  Her son is doing well.    Since delivery she has been sexually active.  She does not have concerns about post-partum blues/depression.   She is breast feeding and plans to continues for 6 month(s).  For ongoing contraception, her plans are IUD - Paraguard.    The following portions of the patient's history were reviewed and updated as appropriate:current medications and allergies    Smoking status: Never Smoker                                                              Smokeless status: Never Used                          Review of Systems  Constitutional POS: nothing reported    NEG: anorexia or night sweats   Gastointestinal POS: nothing reported    NEG: bloating, change in bowel habits, melena or reflux symptoms   Genitourinary POS: nothing reported    NEG: dysuria or hematuria   Integument POS: nothing reported    NEG: moles that are changing in size, shape, color or rashes   Breast POS: nothing reported    NEG: persistent breast lump, skin dimpling or nipple discharge        Objective   BP 98/70  Resp 14  Ht 170.2 cm (67\")  Wt 62 kg (136 lb 9.6 oz)  LMP  (LMP Unknown)  Breastfeeding? Yes  BMI 21.39 kg/m2    General:  well developed; well nourished  no acute distress   Abdomen: soft, non-tender; no masses  no umbilical or inginual hernias are present  no hepato-splenomegaly   Pelvis: Clinical staff was present for exam  External genitalia:  normal appearance of the external genitalia including Bartholin's and [place]'s glands.  :  urethral meatus normal;  Vaginal:  normal pink mucosa without prolapse or lesions.  Cervix:  normal appearance.  Uterus:  normal size, shape and consistency.  Adnexa:  normal bimanual exam of the adnexa.          Assessment   1. Normal 6 week " postpartum exam  2. Contraceptive counseling     Plan   1. BC options reviewed and compared today: IUD - Paraguard  2. Follow up for annual exam 8 months    New Medications Ordered This Visit   Medications   • PARAGARD INTRAUTERINE COPPER intrauterine device IUD     Refill:  0   • lisdexamfetamine (VYVANSE) 60 MG capsule     Sig: Take 60 mg by mouth Every Morning          This note was electronically signed.    MD TINY PerdomoA

## 2017-12-19 NOTE — PROGRESS NOTES
IUD Insertion    No LMP recorded (lmp unknown).    Date of procedure:  12/19/2017    Risks and benefits discussed? yes  All questions answered? yes  Consents given by The patient  Written consent obtained? yes    Local anesthesia used:  yes - 4 cc's of  Meds; anesthesia local: 2% lidocaine    Procedure documentation:    After verifying the patient had a low probability of being pregnant and met the criteria for insertion, a sterile speculum has placed and the cervix was cleansed with an antiseptic solution.  Vaginal discharge was scant.  The anterior lip of the cervix was grasped with a tenaculum and the uterine cavity was gently sounded. There was no difficulty passing the sound through the cervix.  Cervical dilation did not need to be performed prior to placing the IUD.  The uterus was midposition and sounded to 9 cms.  The ParaGard was then prepared per the manufacturers instructions.    The Paraguard was advanced through the cervical canal until the upper edge of the flange was flush with the external cervix.  The insertion jeannette was held in place while the insertion tube was withdrawn.  I waited 10-15 seconds for the arms of the IUS to fully open and the devise to seat in the cavity.  The jeannette was removed first followed by the insertion tube.. The string was cut 3 cms in length.  Bleeding from the cervix was scant.    She tolerated the procedure without any difficulty.     Post procedure instructions: It was reviewed that the Paraguard will not alter the timing of when she bleeds but it may increase the quantity of flow and cramps. Efficacy rate of > 98% over the 10 years was discussed.  Spontaneous expulsion rate of 1-2% was also discussed.  If she has any issue with fever or excessive bleeding or pain she is to call the office immediately.  Otherwise I would like to see her back in 6 weeks with an ultrasound to confirm correct placement    This note was electronically signed.    Ag Montgomery M.D.  MAURICE  December 19, 2017

## 2018-02-09 ENCOUNTER — OFFICE VISIT (OUTPATIENT)
Dept: OBSTETRICS AND GYNECOLOGY | Facility: CLINIC | Age: 33
End: 2018-02-09

## 2018-02-09 VITALS
BODY MASS INDEX: 20.18 KG/M2 | RESPIRATION RATE: 14 BRPM | DIASTOLIC BLOOD PRESSURE: 80 MMHG | SYSTOLIC BLOOD PRESSURE: 110 MMHG | HEART RATE: 74 BPM | WEIGHT: 128.6 LBS | HEIGHT: 67 IN | OXYGEN SATURATION: 99 %

## 2018-02-09 DIAGNOSIS — Z30.431 ENCOUNTER FOR ROUTINE CHECKING OF INTRAUTERINE CONTRACEPTIVE DEVICE (IUD): Primary | ICD-10-CM

## 2018-02-09 PROCEDURE — 99213 OFFICE O/P EST LOW 20 MIN: CPT | Performed by: OBSTETRICS & GYNECOLOGY

## 2018-02-14 NOTE — PROGRESS NOTES
"Subjective   Chief Complaint   Patient presents with   • Follow-up     Paragard IUD string check     Liv Houser is a 32 y.o. year old  presenting to be seen for follow-up of her recently placed ParaGard.  Since the time of insertion flow is typically normal.  She has been sexually active.  [place] has not been painful for her.   [place] has not been painful for her partner.    OTHER COMPLAINTS:  Nothing else       Objective   /80  Pulse 74  Resp 14  Ht 170.2 cm (67\")  Wt 58.3 kg (128 lb 9.6 oz)  LMP  (LMP Unknown)  SpO2 99%  Breastfeeding? No  BMI 20.14 kg/m2    Imaging   No data reviewed     Pelvic exam-IUD strings visualized       Assessment   1. Contraception counseling     Plan   1. Patient is satisfied with ParaGard.  She will return to clinic in 6 months for her annual exam and Pap smear      No orders of the defined types were placed in this encounter.         Total time spent today with Liv  was 20 minutes (level 3).  Greater than 50% of the time was spent coordinating care, answering her questions and counseling regarding contraceptive choices and efficacy rates and safe sex.    This note was electronically signed.    Ag Montgomery MD MAURICE  2018  "

## 2018-03-16 ENCOUNTER — LAB (OUTPATIENT)
Dept: LAB | Facility: HOSPITAL | Age: 33
End: 2018-03-16

## 2018-03-16 ENCOUNTER — TRANSCRIBE ORDERS (OUTPATIENT)
Dept: LAB | Facility: HOSPITAL | Age: 33
End: 2018-03-16

## 2018-03-16 DIAGNOSIS — J11.1 FLU: Primary | ICD-10-CM

## 2018-03-16 DIAGNOSIS — J11.1 FLU: ICD-10-CM

## 2018-03-16 LAB
FLUAV SUBTYP SPEC NAA+PROBE: NOT DETECTED
FLUBV RNA ISLT QL NAA+PROBE: NOT DETECTED

## 2018-03-16 PROCEDURE — 87502 INFLUENZA DNA AMP PROBE: CPT

## 2018-05-15 ENCOUNTER — OFFICE VISIT (OUTPATIENT)
Dept: OBSTETRICS AND GYNECOLOGY | Facility: CLINIC | Age: 33
End: 2018-05-15

## 2018-05-15 VITALS
DIASTOLIC BLOOD PRESSURE: 68 MMHG | HEIGHT: 67 IN | SYSTOLIC BLOOD PRESSURE: 102 MMHG | BODY MASS INDEX: 19.74 KG/M2 | OXYGEN SATURATION: 99 % | RESPIRATION RATE: 14 BRPM | WEIGHT: 125.8 LBS | HEART RATE: 59 BPM

## 2018-05-15 DIAGNOSIS — Z01.419 ENCOUNTER FOR GYNECOLOGICAL EXAMINATION WITHOUT ABNORMAL FINDING: Primary | ICD-10-CM

## 2018-05-15 PROCEDURE — 99395 PREV VISIT EST AGE 18-39: CPT | Performed by: OBSTETRICS & GYNECOLOGY

## 2018-05-15 NOTE — PROGRESS NOTES
Subjective   Chief Complaint   Patient presents with   • Gynecologic Exam     No complaints     Liv Houser is a 32 y.o. year old  presenting to be seen for her annual exam.     SEXUAL Hx:  She is currently sexually active.  In the past year there has not been new sexual partners.    Condoms are not typically used.  She would not like to be screened for STD's at today's exam.  Current birth control method: IUD - Paraguard.  She is happy with her current method of contraception and does not want to discuss alternative methods of contraception.  MENSTRUAL Hx:  No LMP recorded (lmp unknown).  In the past 6 months her cycles have been regular, predictable and occur monthly.  Her menstrual flow is normal.   Each month on average there are roughly 0 day(s) of very heavy flow.    Intermenstrual bleeding is absent.    Post-coital bleeding is absent.  Dysmenorrhea: is not affecting her activities of daily living  PMS: is not affecting her activities of daily living  Her cycles are not a source of concern for her that she wishes to discuss today.  HEALTH Hx:  She exercises regularly: no (and has no plans to become more active).  She wears her seat belt: yes.  She has concerns about domestic violence: no.  OTHER COMPLAINTS:  Nothing else    The following portions of the patient's history were reviewed and updated as appropriate:problem list, current medications, allergies, past family history, past medical history, past social history and past surgical history.    Smoking status: Never Smoker                                                              Smokeless tobacco: Never Used                        Review of Systems  Constitutional POS: nothing reported    NEG: anorexia or night sweats   Gastointestinal POS: nothing reported    NEG: bloating, change in bowel habits, melena or reflux symptoms   Genitourinary POS: nothing reported    NEG: dysuria or hematuria   Integument POS: nothing reported    NEG: moles  "that are changing in size, shape, color or rashes   Breast POS: nothing reported    NEG: persistent breast lump, skin dimpling or nipple discharge        Objective   /68   Pulse 59   Resp 14   Ht 170.2 cm (67\")   Wt 57.1 kg (125 lb 12.8 oz)   LMP  (LMP Unknown) Comment: Paragard IUD  SpO2 99%   Breastfeeding? Yes   BMI 19.70 kg/m²     General:  well developed; well nourished  no acute distress   Skin:  No suspicious lesions seen   Thyroid: normal to inspection and palpation   Breasts:  Examined in supine position  Symmetric without masses or skin dimpling  Nipples normal without inversion, lesions or discharge  There are no palpable axillary nodes   Abdomen: soft, non-tender; no masses  no umbilical or inginual hernias are present  no hepato-splenomegaly   Pelvis: Clinical staff was present for exam  External genitalia:  normal appearance of the external genitalia including Bartholin's and [place]'s glands.  :  urethral meatus normal;  Vaginal:  normal pink mucosa without prolapse or lesions.  Cervix:   normal appearance. Pap smear collected  Uterus:  normal size, shape and consistency.  Adnexa:  normal bimanual exam of the adnexa.        Assessment   1. Well woman exam     Plan   1. Await Pap smear results for plan of care.  Patient will otherwise return to clinic in 1 year or on an as-needed basis.      No orders of the defined types were placed in this encounter.         This note was electronically signed.    Ag Montgomery MD MAURICE  May 15, 2018  "

## 2019-01-04 ENCOUNTER — OFFICE VISIT (OUTPATIENT)
Dept: OBSTETRICS AND GYNECOLOGY | Facility: CLINIC | Age: 34
End: 2019-01-04

## 2019-01-04 VITALS
SYSTOLIC BLOOD PRESSURE: 114 MMHG | BODY MASS INDEX: 20.72 KG/M2 | WEIGHT: 132 LBS | HEIGHT: 67 IN | RESPIRATION RATE: 14 BRPM | DIASTOLIC BLOOD PRESSURE: 80 MMHG

## 2019-01-04 DIAGNOSIS — Z30.432 ENCOUNTER FOR IUD REMOVAL: Primary | ICD-10-CM

## 2019-01-04 PROCEDURE — 58301 REMOVE INTRAUTERINE DEVICE: CPT | Performed by: OBSTETRICS & GYNECOLOGY

## 2019-01-04 NOTE — PROGRESS NOTES
IUD Removal    Date of procedure:  1/4/2019    Risks and benefits discussed? yes  All questions answered? yes  Consents given by The patient  Written consent obtained? yes  Reason for removal: Desires pregnancy    Local anesthesia used:  no    Procedure documentation:    A speculum was placed in order to view the cervix.  A tenaculum did not need to be placed on the anterior cervical lip.  Cervical dilation did not need to be performed in order to access the string.  The IUD string was easily seen.  The string was grasped and the IUD was removed without difficulty.  The IUD did not appear to be adherent to the uterine cavity. It was removed intact.    She tolerated the procedure without any difficulty.     Post procedure instructions: Patient notified to call with heavy bleeding, fever or increasing pain.    Follow up for annual exam 5 month    This note was electronically signed.    Ag Montgomery M.D. MAURICE.  January 4, 2019

## 2019-05-11 ENCOUNTER — LAB (OUTPATIENT)
Dept: LAB | Facility: HOSPITAL | Age: 34
End: 2019-05-11

## 2019-05-11 DIAGNOSIS — N91.2 AMENORRHEA: Primary | ICD-10-CM

## 2019-05-11 DIAGNOSIS — N91.2 AMENORRHEA: ICD-10-CM

## 2019-05-11 LAB
HCG INTACT+B SERPL-ACNC: 882.6 MIU/ML
HCG SERPL QL: POSITIVE

## 2019-05-11 PROCEDURE — 84702 CHORIONIC GONADOTROPIN TEST: CPT

## 2019-05-11 PROCEDURE — 36415 COLL VENOUS BLD VENIPUNCTURE: CPT

## 2019-05-11 PROCEDURE — 84703 CHORIONIC GONADOTROPIN ASSAY: CPT

## 2019-06-10 ENCOUNTER — APPOINTMENT (OUTPATIENT)
Dept: LAB | Facility: HOSPITAL | Age: 34
End: 2019-06-10

## 2019-06-10 ENCOUNTER — INITIAL PRENATAL (OUTPATIENT)
Dept: OBSTETRICS AND GYNECOLOGY | Facility: CLINIC | Age: 34
End: 2019-06-10

## 2019-06-10 VITALS — WEIGHT: 138.8 LBS | SYSTOLIC BLOOD PRESSURE: 100 MMHG | DIASTOLIC BLOOD PRESSURE: 62 MMHG | BODY MASS INDEX: 21.74 KG/M2

## 2019-06-10 DIAGNOSIS — O09.529 ANTEPARTUM MULTIGRAVIDA OF ADVANCED MATERNAL AGE: Primary | ICD-10-CM

## 2019-06-10 DIAGNOSIS — O34.219 PREVIOUS CESAREAN DELIVERY AFFECTING PREGNANCY: ICD-10-CM

## 2019-06-10 LAB
BASOPHILS # BLD AUTO: 0.05 10*3/MM3 (ref 0–0.2)
BASOPHILS NFR BLD AUTO: 0.6 % (ref 0–1.5)
DEPRECATED RDW RBC AUTO: 40.5 FL (ref 37–54)
EOSINOPHIL # BLD AUTO: 0.11 10*3/MM3 (ref 0–0.4)
EOSINOPHIL NFR BLD AUTO: 1.3 % (ref 0.3–6.2)
ERYTHROCYTE [DISTWIDTH] IN BLOOD BY AUTOMATED COUNT: 12.3 % (ref 12.3–15.4)
HBV SURFACE AG SERPL QL IA: NORMAL
HCT VFR BLD AUTO: 39.1 % (ref 34–46.6)
HCV AB SER DONR QL: NORMAL
HGB BLD-MCNC: 12.9 G/DL (ref 12–15.9)
HIV1+2 AB SER QL: NORMAL
IMM GRANULOCYTES # BLD AUTO: 0.02 10*3/MM3 (ref 0–0.05)
IMM GRANULOCYTES NFR BLD AUTO: 0.2 % (ref 0–0.5)
LYMPHOCYTES # BLD AUTO: 2.01 10*3/MM3 (ref 0.7–3.1)
LYMPHOCYTES NFR BLD AUTO: 24.5 % (ref 19.6–45.3)
MCH RBC QN AUTO: 29.9 PG (ref 26.6–33)
MCHC RBC AUTO-ENTMCNC: 33 G/DL (ref 31.5–35.7)
MCV RBC AUTO: 90.5 FL (ref 79–97)
MONOCYTES # BLD AUTO: 0.57 10*3/MM3 (ref 0.1–0.9)
MONOCYTES NFR BLD AUTO: 7 % (ref 5–12)
NEUTROPHILS # BLD AUTO: 5.44 10*3/MM3 (ref 1.7–7)
NEUTROPHILS NFR BLD AUTO: 66.4 % (ref 42.7–76)
NRBC BLD AUTO-RTO: 0 /100 WBC (ref 0–0.2)
PLATELET # BLD AUTO: 276 10*3/MM3 (ref 140–450)
PMV BLD AUTO: 8.9 FL (ref 6–12)
RBC # BLD AUTO: 4.32 10*6/MM3 (ref 3.77–5.28)
RPR SER QL: NORMAL
WBC NRBC COR # BLD: 8.2 10*3/MM3 (ref 3.4–10.8)

## 2019-06-10 PROCEDURE — G0432 EIA HIV-1/HIV-2 SCREEN: HCPCS | Performed by: OBSTETRICS & GYNECOLOGY

## 2019-06-10 PROCEDURE — 0501F PRENATAL FLOW SHEET: CPT | Performed by: OBSTETRICS & GYNECOLOGY

## 2019-06-10 PROCEDURE — 36415 COLL VENOUS BLD VENIPUNCTURE: CPT | Performed by: OBSTETRICS & GYNECOLOGY

## 2019-06-10 PROCEDURE — 86803 HEPATITIS C AB TEST: CPT | Performed by: OBSTETRICS & GYNECOLOGY

## 2019-06-10 PROCEDURE — 80081 OBSTETRIC PANEL INC HIV TSTG: CPT | Performed by: OBSTETRICS & GYNECOLOGY

## 2019-06-10 PROCEDURE — 85025 COMPLETE CBC W/AUTO DIFF WBC: CPT | Performed by: OBSTETRICS & GYNECOLOGY

## 2019-06-10 PROCEDURE — 87340 HEPATITIS B SURFACE AG IA: CPT | Performed by: OBSTETRICS & GYNECOLOGY

## 2019-06-11 LAB
ABO GROUP BLD: NORMAL
BLD GP AB SCN SERPL QL: NEGATIVE
RH BLD: POSITIVE
RUBV IGG SERPL IA-ACNC: POSITIVE

## 2019-07-15 ENCOUNTER — LAB REQUISITION (OUTPATIENT)
Dept: LAB | Facility: HOSPITAL | Age: 34
End: 2019-07-15

## 2019-07-15 ENCOUNTER — ROUTINE PRENATAL (OUTPATIENT)
Dept: OBSTETRICS AND GYNECOLOGY | Facility: CLINIC | Age: 34
End: 2019-07-15

## 2019-07-15 VITALS — SYSTOLIC BLOOD PRESSURE: 106 MMHG | WEIGHT: 146.4 LBS | DIASTOLIC BLOOD PRESSURE: 72 MMHG | BODY MASS INDEX: 22.93 KG/M2

## 2019-07-15 DIAGNOSIS — O09.529 ANTEPARTUM MULTIGRAVIDA OF ADVANCED MATERNAL AGE: Primary | ICD-10-CM

## 2019-07-15 DIAGNOSIS — Z00.00 ROUTINE GENERAL MEDICAL EXAMINATION AT A HEALTH CARE FACILITY: ICD-10-CM

## 2019-07-15 DIAGNOSIS — O34.219 PREVIOUS CESAREAN DELIVERY AFFECTING PREGNANCY: ICD-10-CM

## 2019-07-15 LAB — EXTERNAL NIPT: NORMAL

## 2019-07-15 PROCEDURE — 0502F SUBSEQUENT PRENATAL CARE: CPT | Performed by: OBSTETRICS & GYNECOLOGY

## 2019-07-15 PROCEDURE — 36415 COLL VENOUS BLD VENIPUNCTURE: CPT

## 2019-07-15 NOTE — PROGRESS NOTES
Chief Complaint   Patient presents with   • Routine Prenatal Visit       HPI: Liv is a  currently at 13w4d who today reports the following:  Nausea - No; Vaginal bleeding -  No; Heartburn - No.    ROS:  GI: Constipation - No; Diarrhea - No    Neuro: Headache - No; Visual change - No      EXAM:  Vitals: See prenatal flowsheet   Abdomen: See prenatal flowsheet   Urine glucose/protein: See prenatal flowsheet   Pelvic: See prenatal flowsheet     Prenatal Labs  Lab Results   Component Value Date    HGB 12.9 06/10/2019    RUBELLAABIGG Positive 06/10/2019    HEPBSAG Non-Reactive 06/10/2019    ABO O 06/10/2019    RH Positive 06/10/2019    ABSCRN Negative 06/10/2019    UZA3QHK2 Non-Reactive 06/10/2019    HEPCVIRUSABY Non-Reactive 06/10/2019    JEC8ROLN 104 2017    STREPGPB Positive 10/20/2017           MDM:  Impression: 1. Supervision of low risk pregnancy  2. Previous C/S with planned repeat C/S  3. AMA   Tests done today: 1. NIPT   Topics discussed: 1. Continue with PNV's  2. Prenatal labs reviewed  3. GERD   Tests next visit: U/S for anatomic screening   Next visit: See prenatal flowsheet

## 2019-08-26 ENCOUNTER — ROUTINE PRENATAL (OUTPATIENT)
Dept: OBSTETRICS AND GYNECOLOGY | Facility: CLINIC | Age: 34
End: 2019-08-26

## 2019-08-26 VITALS — DIASTOLIC BLOOD PRESSURE: 60 MMHG | WEIGHT: 152.2 LBS | BODY MASS INDEX: 23.84 KG/M2 | SYSTOLIC BLOOD PRESSURE: 108 MMHG

## 2019-08-26 DIAGNOSIS — O09.529 ANTEPARTUM MULTIGRAVIDA OF ADVANCED MATERNAL AGE: Primary | ICD-10-CM

## 2019-08-26 DIAGNOSIS — O34.219 PREVIOUS CESAREAN DELIVERY AFFECTING PREGNANCY: ICD-10-CM

## 2019-08-26 PROCEDURE — 0502F SUBSEQUENT PRENATAL CARE: CPT | Performed by: OBSTETRICS & GYNECOLOGY

## 2019-08-26 NOTE — PROGRESS NOTES
Chief Complaint   Patient presents with   • Routine Prenatal Visit     Nausea, lower back strain       HPI: Liv is a  currently at 19w4d who today reports the following:  Contractions - No; Leaking - No; Vaginal bleeding -  No; Heartburn - No.    ROS:  GI: Nausea - No ; Constipation - No; Diarrhea - No    Neuro: Headache - No ; Visual change - No      EXAM:  Vitals: See prenatal flowsheet   Abdomen: See prenatal flowsheet   Urine glucose/protein: See prenatal flowsheet   Pelvic: See prenatal flowsheet     Lab Results   Component Value Date    ABO O 06/10/2019    RH Positive 06/10/2019    ABSCRN Negative 06/10/2019       MDM:  Impression: 1. Supervision of high risk pregnancy  2. Previous C/S with planned repeat C/S  3. AMA   Tests done today: 1. U/S for anatomic screening - anatomy completely seen today   Topics discussed: 1. Continue with PNV's  2. Prenatal labs reviewed  3. nausea   Tests next visit: none   Next visit: See prenatal flowsheet

## 2019-09-13 ENCOUNTER — ROUTINE PRENATAL (OUTPATIENT)
Dept: OBSTETRICS AND GYNECOLOGY | Facility: CLINIC | Age: 34
End: 2019-09-13

## 2019-09-13 VITALS — SYSTOLIC BLOOD PRESSURE: 98 MMHG | DIASTOLIC BLOOD PRESSURE: 64 MMHG | BODY MASS INDEX: 24.12 KG/M2 | WEIGHT: 154 LBS

## 2019-09-13 DIAGNOSIS — O34.219 PREVIOUS CESAREAN DELIVERY AFFECTING PREGNANCY: ICD-10-CM

## 2019-09-13 DIAGNOSIS — O09.529 ANTEPARTUM MULTIGRAVIDA OF ADVANCED MATERNAL AGE: Primary | ICD-10-CM

## 2019-09-13 PROCEDURE — 0502F SUBSEQUENT PRENATAL CARE: CPT | Performed by: OBSTETRICS & GYNECOLOGY

## 2019-09-13 NOTE — PROGRESS NOTES
Chief Complaint   Patient presents with   • Routine Prenatal Visit     No complaints       HPI: Liv is a  currently at 22w1d who today reports the following:  Contractions - No; Leaking - No; Vaginal bleeding -  No; Heartburn - No.    ROS:  GI: Nausea - No ; Constipation - No; Diarrhea - No    Neuro: Headache - No ; Visual change - No      EXAM:  Vitals: See prenatal flowsheet   Abdomen: See prenatal flowsheet   Urine glucose/protein: See prenatal flowsheet   Pelvic: See prenatal flowsheet     Lab Results   Component Value Date    ABO O 06/10/2019    RH Positive 06/10/2019    ABSCRN Negative 06/10/2019       MDM:  Impression: 1. Supervision of high risk pregnancy  2. Previous C/S with planned repeat C/S  3. AMA   Tests done today: 1. none   Topics discussed: 1. Continue with PNV's  2. Prenatal labs reviewed  3. none - she had no major complaints,questions or concerns   Tests next visit: none   Next visit: See prenatal flowsheet

## 2019-09-27 ENCOUNTER — ROUTINE PRENATAL (OUTPATIENT)
Dept: OBSTETRICS AND GYNECOLOGY | Facility: CLINIC | Age: 34
End: 2019-09-27

## 2019-09-27 VITALS — WEIGHT: 159.4 LBS | BODY MASS INDEX: 24.97 KG/M2 | SYSTOLIC BLOOD PRESSURE: 118 MMHG | DIASTOLIC BLOOD PRESSURE: 64 MMHG

## 2019-09-27 DIAGNOSIS — O09.522 ELDERLY MULTIGRAVIDA IN SECOND TRIMESTER: Primary | ICD-10-CM

## 2019-09-27 PROCEDURE — 0502F SUBSEQUENT PRENATAL CARE: CPT | Performed by: OBSTETRICS & GYNECOLOGY

## 2019-09-27 NOTE — PROGRESS NOTES
Chief Complaint   Patient presents with   • Routine Prenatal Visit     no c/o       HPI: Liv is a  currently at 24w1d who today reports the following:  Contractions - No; Leaking - No; Vaginal bleeding -  No; Heartburn - YES.    ROS:  GI: Nausea - No ; Constipation - No; Diarrhea - No    Neuro: Headache - No ; Visual change - No      EXAM:  Vitals: See prenatal flowsheet   Abdomen: See prenatal flowsheet   Urine glucose/protein: See prenatal flowsheet   Pelvic: See prenatal flowsheet     Lab Results   Component Value Date    ABO O 06/10/2019    RH Positive 06/10/2019    ABSCRN Negative 06/10/2019       MDM:  Impression: 1. Supervision of high risk pregnancy  2. Previous C/S with planned repeat C/S  3. AMA   Tests done today: 1. none   Topics discussed: 1. Continue with PNV's  2. Prenatal labs reviewed  3. TDAP vaccination  4. flu vaccine   Tests scheduled today for her next visit:   GCT  CBC

## 2019-10-20 PROBLEM — O09.523 MULTIGRAVIDA OF ADVANCED MATERNAL AGE IN THIRD TRIMESTER: Status: ACTIVE | Noted: 2019-10-20

## 2019-10-20 PROBLEM — O34.219 HISTORY OF CESAREAN DELIVERY AFFECTING PREGNANCY: Status: ACTIVE | Noted: 2019-10-20

## 2019-10-20 PROBLEM — O09.90 HIGH-RISK PREGNANCY: Status: ACTIVE | Noted: 2019-10-20

## 2019-10-20 PROBLEM — G93.0 CHOROID PLEXUS CYST: Status: ACTIVE | Noted: 2019-10-20

## 2019-10-23 ENCOUNTER — LAB (OUTPATIENT)
Dept: LAB | Facility: HOSPITAL | Age: 34
End: 2019-10-23

## 2019-10-23 ENCOUNTER — ROUTINE PRENATAL (OUTPATIENT)
Dept: OBSTETRICS AND GYNECOLOGY | Facility: CLINIC | Age: 34
End: 2019-10-23

## 2019-10-23 VITALS — WEIGHT: 159.8 LBS | BODY MASS INDEX: 25.03 KG/M2 | SYSTOLIC BLOOD PRESSURE: 116 MMHG | DIASTOLIC BLOOD PRESSURE: 74 MMHG

## 2019-10-23 DIAGNOSIS — M53.3 CHRONIC SI JOINT PAIN: ICD-10-CM

## 2019-10-23 DIAGNOSIS — O09.522 ELDERLY MULTIGRAVIDA IN SECOND TRIMESTER: ICD-10-CM

## 2019-10-23 DIAGNOSIS — O09.523 MULTIGRAVIDA OF ADVANCED MATERNAL AGE IN THIRD TRIMESTER: ICD-10-CM

## 2019-10-23 DIAGNOSIS — O34.219 HISTORY OF CESAREAN DELIVERY AFFECTING PREGNANCY: ICD-10-CM

## 2019-10-23 DIAGNOSIS — O09.92 HIGH-RISK PREGNANCY IN SECOND TRIMESTER: Primary | ICD-10-CM

## 2019-10-23 DIAGNOSIS — G89.29 CHRONIC SI JOINT PAIN: ICD-10-CM

## 2019-10-23 LAB
DEPRECATED RDW RBC AUTO: 41.7 FL (ref 37–54)
ERYTHROCYTE [DISTWIDTH] IN BLOOD BY AUTOMATED COUNT: 12.2 % (ref 12.3–15.4)
GLUCOSE 1H P 100 G GLC PO SERPL-MCNC: 97 MG/DL (ref 65–140)
HCT VFR BLD AUTO: 34.5 % (ref 34–46.6)
HGB BLD-MCNC: 11.7 G/DL (ref 12–15.9)
MCH RBC QN AUTO: 31.5 PG (ref 26.6–33)
MCHC RBC AUTO-ENTMCNC: 33.9 G/DL (ref 31.5–35.7)
MCV RBC AUTO: 92.7 FL (ref 79–97)
PLATELET # BLD AUTO: 262 10*3/MM3 (ref 140–450)
PMV BLD AUTO: 8.6 FL (ref 6–12)
RBC # BLD AUTO: 3.72 10*6/MM3 (ref 3.77–5.28)
WBC NRBC COR # BLD: 7.6 10*3/MM3 (ref 3.4–10.8)

## 2019-10-23 PROCEDURE — 36415 COLL VENOUS BLD VENIPUNCTURE: CPT | Performed by: OBSTETRICS & GYNECOLOGY

## 2019-10-23 PROCEDURE — 85027 COMPLETE CBC AUTOMATED: CPT | Performed by: OBSTETRICS & GYNECOLOGY

## 2019-10-23 PROCEDURE — 82950 GLUCOSE TEST: CPT

## 2019-10-23 PROCEDURE — 0502F SUBSEQUENT PRENATAL CARE: CPT | Performed by: OBSTETRICS & GYNECOLOGY

## 2019-10-23 RX ORDER — FAMOTIDINE 20 MG/1
20 TABLET, FILM COATED ORAL 2 TIMES DAILY
COMMUNITY
End: 2020-01-12 | Stop reason: HOSPADM

## 2019-10-23 NOTE — PROGRESS NOTES
Chief Complaint   Patient presents with   • Routine Prenatal Visit     No complaints       HPI: Liv is a  currently at 27w6d who today reports the following:  Contractions - No; Leaking - No; Vaginal bleeding -  No; Heartburn - No.    ROS:  GI: Nausea - No ; Constipation - No; Diarrhea - No    Neuro: Headache - No ; Visual change - No      EXAM:  Vitals: See prenatal flowsheet   Abdomen: See prenatal flowsheet   Urine glucose/protein: See prenatal flowsheet   Pelvic: See prenatal flowsheet     Lab Results   Component Value Date    ABO O 06/10/2019    RH Positive 06/10/2019    ABSCRN Negative 06/10/2019       MDM:  Impression: 1. Supervision of high risk pregnancy  2. Previous C/S with planned repeat C/S at 39 weeks  3. CP cysts  4. AMA  5. SI joint pain    Tests done today: 1. none   Topics discussed: 1. Continue with PNV's  2. Prenatal labs reviewed  3.  labor signs and symptoms  4. TDAP vaccination   5. PT referral    Tests scheduled today for her next visit:   Schedule repeat c/s at 39 weeks    Order PDC scan for 32 weeks at next visit

## 2019-11-06 ENCOUNTER — ROUTINE PRENATAL (OUTPATIENT)
Dept: OBSTETRICS AND GYNECOLOGY | Facility: CLINIC | Age: 34
End: 2019-11-06

## 2019-11-06 VITALS — WEIGHT: 161.8 LBS | BODY MASS INDEX: 25.34 KG/M2 | SYSTOLIC BLOOD PRESSURE: 108 MMHG | DIASTOLIC BLOOD PRESSURE: 72 MMHG

## 2019-11-06 DIAGNOSIS — Z34.80 PRENATAL CARE OF MULTIGRAVIDA, ANTEPARTUM: Primary | ICD-10-CM

## 2019-11-06 DIAGNOSIS — O35.03X1 CHOROID PLEXUS CYST OF FETUS AFFECTING CARE OF MOTHER, ANTEPARTUM, FETUS 1: ICD-10-CM

## 2019-11-06 PROCEDURE — 80307 DRUG TEST PRSMV CHEM ANLYZR: CPT | Performed by: OBSTETRICS & GYNECOLOGY

## 2019-11-06 PROCEDURE — 0502F SUBSEQUENT PRENATAL CARE: CPT | Performed by: OBSTETRICS & GYNECOLOGY

## 2019-11-06 NOTE — PROGRESS NOTES
Chief Complaint   Patient presents with   • Routine Prenatal Visit     pt states that she has frequent urination and tristen dumas all of the time       HPI: Liv is a  currently at 29w6d who today reports the following:  Contractions - No; Leaking - No; Vaginal bleeding -  No; Heartburn - No.    ROS:  GI: Nausea - No ; Constipation - No; Diarrhea - No    Neuro: Headache - No ; Visual change - No      EXAM:  Vitals: See prenatal flowsheet   Abdomen: See prenatal flowsheet   Urine glucose/protein: See prenatal flowsheet   Pelvic: See prenatal flowsheet     Lab Results   Component Value Date    HGB 11.7 (L) 10/23/2019    HCT 34.5 10/23/2019    GCT 97 10/23/2019    ABO O 06/10/2019    RH Positive 06/10/2019    ABSCRN Negative 06/10/2019       MDM:  Impression: 1. Supervision of high risk pregnancy  2. Previous C/S with planned repeat C/S at 39 weeks   3. AMA   Tests done today: 1. none   Topics discussed: 1. Continue with PNV's  2. Prenatal labs reviewed  3. TDAP vaccination   Tests scheduled today for her next visit:   U/S for follow up of CP cyst

## 2019-11-07 ENCOUNTER — APPOINTMENT (OUTPATIENT)
Dept: LAB | Facility: HOSPITAL | Age: 34
End: 2019-11-07

## 2019-11-07 ENCOUNTER — APPOINTMENT (OUTPATIENT)
Dept: PHYSICAL THERAPY | Facility: HOSPITAL | Age: 34
End: 2019-11-07

## 2019-11-07 LAB
AMPHET+METHAMPHET UR QL: NEGATIVE
AMPHETAMINES UR QL: NEGATIVE
BARBITURATES UR QL SCN: NEGATIVE
BENZODIAZ UR QL SCN: NEGATIVE
BUPRENORPHINE SERPL-MCNC: NEGATIVE NG/ML
CANNABINOIDS SERPL QL: NEGATIVE
COCAINE UR QL: NEGATIVE
METHADONE UR QL SCN: NEGATIVE
OPIATES UR QL: NEGATIVE
OXYCODONE UR QL SCN: NEGATIVE
PCP UR QL SCN: NEGATIVE
PROPOXYPH UR QL: NEGATIVE
TRICYCLICS UR QL SCN: NEGATIVE

## 2019-11-07 PROCEDURE — 81003 URINALYSIS AUTO W/O SCOPE: CPT | Performed by: OBSTETRICS & GYNECOLOGY

## 2019-11-08 LAB
BILIRUB UR QL STRIP: NEGATIVE
CLARITY UR: CLEAR
COLOR UR: YELLOW
GLUCOSE UR STRIP-MCNC: NEGATIVE MG/DL
HGB UR QL STRIP.AUTO: NEGATIVE
KETONES UR QL STRIP: NEGATIVE
LEUKOCYTE ESTERASE UR QL STRIP.AUTO: NEGATIVE
NITRITE UR QL STRIP: NEGATIVE
PH UR STRIP.AUTO: 6.5 [PH] (ref 5–8)
PROT UR QL STRIP: NEGATIVE
SP GR UR STRIP: 1.01 (ref 1–1.03)
UROBILINOGEN UR QL STRIP: NORMAL

## 2019-11-12 ENCOUNTER — HOSPITAL ENCOUNTER (OUTPATIENT)
Dept: PHYSICAL THERAPY | Facility: HOSPITAL | Age: 34
Setting detail: THERAPIES SERIES
Discharge: HOME | End: 2019-11-12

## 2019-11-12 DIAGNOSIS — M53.3 CHRONIC SACROILIAC JOINT PAIN: Primary | ICD-10-CM

## 2019-11-12 DIAGNOSIS — M25.552 LEFT HIP PAIN: ICD-10-CM

## 2019-11-12 DIAGNOSIS — G89.29 CHRONIC SACROILIAC JOINT PAIN: Primary | ICD-10-CM

## 2019-11-12 PROCEDURE — 97161 PT EVAL LOW COMPLEX 20 MIN: CPT

## 2019-11-12 NOTE — THERAPY EVALUATION
Outpatient Physical Therapy Ortho Initial Evaluation   [place]     Patient Name: Liv Houser  : 1985  MRN: [id number]  Today's Date: 2019      Visit Date: 2019    Patient Active Problem List   Diagnosis   • Amenorrhea   • High-risk pregnancy   • Multigravida of advanced maternal age in third trimester   • History of  delivery affecting pregnancy - desires repeat c/s   • Choroid plexus cyst        Past Medical History:   Diagnosis Date   • ADHD (attention deficit hyperactivity disorder)    • History of abnormal cervical Pap smear    • IUD (intrauterine device) in place     Paragard REMOVED        Past Surgical History:   Procedure Laterality Date   • CERVICAL CONIZATION, LEEP  2010   •  SECTION N/A 2017    Procedure:  SECTION PRIMARY;  Surgeon: Ag Montgomery MD;  Location: [facility] DELIVERY;  Service:    • WISDOM TOOTH EXTRACTION       Visit Dx:     ICD-10-CM ICD-9-CM   1. Chronic sacroiliac joint pain M53.3 724.6    G89.29 338.29   2. Left hip pain M25.552 719.45     Patient History     Row Name 19 0830             History    Chief Complaint  Pain  -AC      Type of Pain  Hip pain Left  -AC      Date Current Problem(s) Began  10/01/19  -      Brief Description of Current Complaint  Pt presents at 31 weeks of pregnancy with onset of bilateral SIJ pain, mostly on the left. Pain has shifted to hip about 2 weeks ago. Still able to run but has pain with prolonged sitting and walking. Feels her muscles get stiff prior to getting up from a chair. Denies any numbness/tingling. Denies any history of LBP/hip pain.  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  -AC       Patient seeing anyone else for problem(s)?  Dallin  -AC      How has patient tried to help current problem?  Lidoderm patch, Tylenol; piriformis stretching  -AC      Surgery/Hospitalization    -AC      History of Previous Related Injuries  None  -AC      Are you or can you be pregnant  Yes  -AC         Pain     Pain Location  Sacrum;Hip  -AC      Pain at Present  0  -AC      Pain at Best  0  -AC      Pain at Worst  4  -AC      Pain Frequency  Other (Comment) Daily  -AC      Pain Description  Tightness;Other (Comment) Stiffness  -AC      What Performance Factors Make the Current Problem(s) WORSE?  Walking, sitting, rolling over in bed  -AC      What Performance Factors Make the Current Problem(s) BETTER?  Lying on R side with pillow between knees  -AC      Is your sleep disturbed?  Yes  -AC      Total hours of sleep per night  About 8 total, broken up  -AC      What position do you sleep in?  Right sidelying;Other (comment) With pillow between knees  -AC      Difficulties at work?  Walking at the hospital, sitting at computer  -AC      Difficulties with ADL's?  Bending over to  kid  -AC         Fall Risk Assessment    Any falls in the past year:  No  -AC         Daily Activities    Primary Language  English  -AC      Are you able to read  Yes  -AC      Are you able to write  Yes  -AC      How does patient learn best?  Listening;Reading;Demonstration  -AC      Teaching needs identified  Home Exercise Program;Management of Condition  -AC      Patient is concerned about/has problems with  Climbing Stairs;Sitting;Walking;Other (comment) Sleeping  -AC      Does patient have problems with the following?  None  -AC      Barriers to learning  None  -AC      Pt Participated in POC and Goals  Yes  -AC         Safety    Are you being neglected by a caregiver  No  -AC        User Key  (r) = Recorded By, (t) = Taken By, (c) = Cosigned By    Initials Name Provider Type    AC Jil Fish, PT Physical  Therapist        PT Ortho     Row Name 11/12/19 6533       Posture/Observations    Alignment Options  Lumbar lordosis  -AC    Lumbar lordosis  Increased  -AC    Posture/Observations Comments  R ilium slightly anteriorly rotated  -AC       Quarter Clearing    Quarter Clearing  Lower Quarter Clearing  -AC       DTR- Lower Quarter Clearing    Patellar tendon (L2-4)  Bilateral:;1- Minimal response  -AC    Achilles tendon (S1-2)  Bilateral:;1- Minimal response  -AC       Myotomal Screen- Lower Quarter Clearing    Hip flexion (L2)  Bilateral:;4 (Good)  -AC    Knee extension (L3)  Bilateral:;4+ (Good +)  -AC    Ankle DF (L4)  Bilateral:;4+ (Good +) Incr'd L lateral hip pain  -AC    Great toe extension (L5)  Bilateral:;4+ (Good +)  -AC    Knee flexion (S2)  Bilateral:;4+ (Good +)  -AC       Lumbar ROM Screen- Lower Quarter Clearing    Lumbar Flexion  Normal  -AC    Lumbar Extension  Normal  -AC    Lumbar Lateral Flexion  Normal  -AC    Lumbar Rotation  Normal  -AC       SI/Hip Screen- Lower Quarter Clearing    Lani's/Naga's test  Left:;Positive  -AC    Posterior thigh sheer  Left:;Positive  -AC       Special Tests/Palpation    Special Tests/Palpation  Lumbar/SI  -AC       Lumbosacral Palpation    Lumbosacral Palpation?  Yes  -AC    SI  Left:;Tender  -AC    Piriformis  Left:;Tender;Guarded/taut  -AC       General ROM    GENERAL ROM COMMENTS  Decr'd hip mobility in LLE with significant pain noted  -AC       MMT (Manual Muscle Testing)    Rt Lower Ext  Rt Hip Internal (Medial) Rotation;Rt Hip External (Lateral) Rotation;Rt Hip ABduction;Rt Hip ADduction  -AC    Lt Lower Ext  Lt Hip ABduction;Lt Hip ADduction;Lt Hip Internal (Medial) Rotation;Lt Hip External (Lateral) Rotation  -AC       MMT Right Lower Ext    Rt Hip ABduction MMT, Gross Movement  (4+/5) good plus  -AC    Rt Hip ADduction MMT, Gross Movement  (4+/5) good plus  -AC    Rt Hip Internal (Medial) Rotation MMT, Gross Movement  (4/5) good  -AC    Rt Hip External  (Lateral) Rotation MMT, Gross Movement  (4+/5) good plus  -AC       MMT Left Lower Ext    Lt Hip ABduction MMT, Gross Movement  (4+/5) good plus  -AC    Lt Hip ADduction MMT, Gross Movement  (4/5) good Pubic bone and lateral hip pain  -AC    Lt Hip Internal (Medial) Rotation MMT, Gross Movement  (4+/5) good plus  -AC    Lt Hip External (Lateral) Rotation MMT, Gross Movement  (4+/5) good plus  -AC       Sensation    Sensation WNL?  WNL  -AC      User Key  (r) = Recorded By, (t) = Taken By, (c) = Cosigned By    Initials Name Provider Type    AC Jil Fish, PT Physical Therapist      Therapy Education  Education Details: Pt educated on pathomechanics of condition and POC and provided HEP including: PPTs, glute sets/PPTs, bridges, and clamshells (red TB provided).   Given: HEP  Program: New  How Provided: Verbal, Demonstration, Written  Provided to: Patient  Level of Understanding: Verbalized, Demonstrated     PT OP Goals     Row Name 11/12/19 0830          PT Short Term Goals    STG Date to Achieve  12/03/19  -AC     STG 1  Client will be independent with HEP to improve strength and manage symptoms.  -AC     STG 1 Progress  New  -AC     STG 2  Client will be independent with donning/doffing SIJ belt.  -AC     STG 2 Progress  New  -AC     STG 3  Pt will report > or = 50% improvement in symptoms.  -AC     STG 3 Progress  New  -AC        Long Term Goals    LTG Date to Achieve  12/24/19  -AC     LTG 1  Client will be independent with comprehensive HEP.  -AC     LTG 1 Progress  New  -AC     LTG 2  Pt will report > or = 75% improvement in symptoms.  -AC     LTG 2 Progress  New  -AC     LTG 3  Pt will report < or = 3/10 pain getting in/out of chair and walking at work.  -AC     LTG 3 Progress  New  -AC     LTG 4  Jason will reduce to < or = 14%.  -AC     LTG 4 Progress  New  -AC        Time Calculation    PT Goal Re-Cert Due Date  01/11/20  -       User Key  (r) = Recorded By, (t) = Taken By, (c) = Cosigned By     Initials Name Provider Type    AC Jil Fish, PT Physical Therapist        PT Assessment/Plan     Row Name 11/12/19 0830          PT Assessment    Functional Limitations  Impaired gait;Limitation in home management;Performance in work activities;Performance in self-care ADL  -AC     Impairments  Pain;Joint mobility;Posture;Range of motion;Impaired muscle length;Poor body mechanics  -AC     Assessment Comments  Pt presents with evolving symptoms of low complexity with signs consistent with SI joint dysfunction secondary to pregnancy.  Pt has significant pain with L hip mobility and difficulty performing home/work duties secondary to pain.  PT intervention is warranted to maximize strength/stability and manage symptoms for remainder of pregnancy term.   -AC     Please refer to paper survey for additional self-reported information  Yes  -AC     Rehab Potential  Good  -AC     Patient/caregiver participated in establishment of treatment plan and goals  Yes  -AC     Patient would benefit from skilled therapy intervention  Yes  -AC        PT Plan    PT Frequency  1x/week  -AC     Predicted Duration of Therapy Intervention (Therapy Eval)  8 weeks   -AC     Planned CPT's?  PT EVAL LOW COMPLEXITY: 36652;PT THER PROC EA 15 MIN: 04227;PT MANUAL THERAPY EA 15 MIN: 24754;PT RE-EVAL: 99373;PT THER ACT EA 15 MIN: 84139;PT NEUROMUSC RE-EDUCATION EA 15 MIN: 64287;PT SELF CARE/HOME MGMT/TRAIN EA 15: 48895;PT THER SUPP EA 15 MIN;PT THER MASS EA 15 MIN: 61454  -AC     PT Plan Comments  Progress pelvic stability exercises as tolerated, progressing to bird dogs/child's pose and ball exercises as tolerated. Consider MET and other manual therapies.   -AC       User Key  (r) = Recorded By, (t) = Taken By, (c) = Cosigned By    Initials Name Provider Type    AC Jil Fish, PT Physical Therapist        Outcome Measure Options: Modifed Arvind  Modified Oswestry  Modified Oswestry Score/Comments: 22%  Time Calculation:   Start  Time: 0830     Therapy Charges for Today     Code Description Service Date Service Provider Modifiers Qty    89453972607 HC PT EVAL LOW COMPLEXITY 4 11/12/2019 Jil Fish, PT GP 1        PT G-Codes  Outcome Measure Options: Modifed Owestry  Modified Oswestry Score/Comments: 22%     Jil Fish, PT  11/12/2019

## 2019-11-20 ENCOUNTER — HOSPITAL ENCOUNTER (OUTPATIENT)
Dept: WOMENS IMAGING | Facility: HOSPITAL | Age: 34
Discharge: HOME | End: 2019-11-20
Admitting: OBSTETRICS & GYNECOLOGY

## 2019-11-20 ENCOUNTER — OFFICE VISIT (OUTPATIENT)
Dept: OBSTETRICS AND GYNECOLOGY | Facility: HOSPITAL | Age: 34
End: 2019-11-20

## 2019-11-20 ENCOUNTER — ROUTINE PRENATAL (OUTPATIENT)
Dept: OBSTETRICS AND GYNECOLOGY | Facility: CLINIC | Age: 34
End: 2019-11-20

## 2019-11-20 VITALS
BODY MASS INDEX: 24.86 KG/M2 | SYSTOLIC BLOOD PRESSURE: 112 MMHG | DIASTOLIC BLOOD PRESSURE: 73 MMHG | HEIGHT: 67 IN | WEIGHT: 158.4 LBS

## 2019-11-20 DIAGNOSIS — Z82.79 FAMILY HISTORY OF CONGENITAL ANOMALY: ICD-10-CM

## 2019-11-20 DIAGNOSIS — Z34.90 PREGNANCY, UNSPECIFIED GESTATIONAL AGE: ICD-10-CM

## 2019-11-20 DIAGNOSIS — O34.219 HISTORY OF CESAREAN DELIVERY AFFECTING PREGNANCY: ICD-10-CM

## 2019-11-20 DIAGNOSIS — O09.523 MULTIGRAVIDA OF ADVANCED MATERNAL AGE IN THIRD TRIMESTER: ICD-10-CM

## 2019-11-20 DIAGNOSIS — O35.03X1 CHOROID PLEXUS CYST OF FETUS AFFECTING CARE OF MOTHER, ANTEPARTUM, FETUS 1: ICD-10-CM

## 2019-11-20 DIAGNOSIS — O09.523 MULTIGRAVIDA OF ADVANCED MATERNAL AGE IN THIRD TRIMESTER: Primary | ICD-10-CM

## 2019-11-20 DIAGNOSIS — O09.93 HIGH-RISK PREGNANCY IN THIRD TRIMESTER: Primary | ICD-10-CM

## 2019-11-20 DIAGNOSIS — Z34.80 PRENATAL CARE OF MULTIGRAVIDA, ANTEPARTUM: ICD-10-CM

## 2019-11-20 PROBLEM — G93.0 CHOROID PLEXUS CYST: Status: RESOLVED | Noted: 2019-10-20 | Resolved: 2019-11-20

## 2019-11-20 PROCEDURE — 76811 OB US DETAILED SNGL FETUS: CPT | Performed by: OBSTETRICS & GYNECOLOGY

## 2019-11-20 PROCEDURE — 76811 OB US DETAILED SNGL FETUS: CPT

## 2019-11-20 PROCEDURE — 0502F SUBSEQUENT PRENATAL CARE: CPT | Performed by: OBSTETRICS & GYNECOLOGY

## 2019-11-20 NOTE — PROGRESS NOTES
Chief Complaint   Patient presents with   • Routine Prenatal Visit     US @ [facility]        HPI: Liv is a  currently at 31w6d who today reports the following:  Contractions - No; Leaking - No; Vaginal bleeding -  No; Swelling of extremities - No.  Concerned she has a vaginal varicosity - declines exam today     ROS:  GI: Nausea - No; Constipation - No; Diarrhea - No    Neuro: Headache - No; Visual change - No      EXAM:  Vitals: See prenatal flowsheet   Abdomen: See prenatal flowsheet   Urine glucose/protein: See prenatal flowsheet   Pelvic: See prenatal flowsheet   MDM:   Impression: 1. Supervision of high risk pregnancy  2. Previous C/S with planned repeat C/S at 39 weeks   3. AMA   Tests done today: 1. U/S for EFW - and CP follow up at PDC - S=D and no CP   Topics discussed: 1. Continue with PNV's  2. Prenatal labs reviewed  3. Reviewed you can have varicosities on the perineum during pregnancy    Tests scheduled today for her next visit:   none       
"I just overthink things."

## 2019-12-04 ENCOUNTER — ROUTINE PRENATAL (OUTPATIENT)
Dept: OBSTETRICS AND GYNECOLOGY | Facility: CLINIC | Age: 34
End: 2019-12-04

## 2019-12-04 VITALS — WEIGHT: 162.8 LBS | BODY MASS INDEX: 25.69 KG/M2 | DIASTOLIC BLOOD PRESSURE: 84 MMHG | SYSTOLIC BLOOD PRESSURE: 116 MMHG

## 2019-12-04 DIAGNOSIS — O09.93 HIGH-RISK PREGNANCY IN THIRD TRIMESTER: Primary | ICD-10-CM

## 2019-12-04 DIAGNOSIS — O09.523 MULTIGRAVIDA OF ADVANCED MATERNAL AGE IN THIRD TRIMESTER: ICD-10-CM

## 2019-12-04 DIAGNOSIS — O34.219 HISTORY OF CESAREAN DELIVERY AFFECTING PREGNANCY: ICD-10-CM

## 2019-12-04 PROCEDURE — 0502F SUBSEQUENT PRENATAL CARE: CPT | Performed by: OBSTETRICS & GYNECOLOGY

## 2019-12-04 NOTE — PROGRESS NOTES
Chief Complaint   Patient presents with   • Routine Prenatal Visit     pt states no c/o       HPI: Liv is a  currently at 33w6d who today reports the following:  Contractions - No; Leaking - No; Vaginal bleeding -  No; Swelling of extremities - No.    ROS:  GI: Nausea - No; Constipation - No; Diarrhea - No    Neuro: Headache - No; Visual change - No      EXAM:  Vitals: See prenatal flowsheet   Abdomen: See prenatal flowsheet   Urine glucose/protein: See prenatal flowsheet   Pelvic: See prenatal flowsheet   MDM:   Impression: 1. Supervision of high risk pregnancy  2. Previous C/S with planned repeat C/S  3. AMA   Tests done today: 1. none   Topics discussed: 1. Continue with PNV's  2. Prenatal labs reviewed  3. labor signs and symptoms  4. symptoms of preeclampsia   Tests scheduled today for her next visit:   GBS testing

## 2019-12-18 ENCOUNTER — ROUTINE PRENATAL (OUTPATIENT)
Dept: OBSTETRICS AND GYNECOLOGY | Facility: CLINIC | Age: 34
End: 2019-12-18

## 2019-12-18 VITALS — DIASTOLIC BLOOD PRESSURE: 78 MMHG | WEIGHT: 166 LBS | BODY MASS INDEX: 26.19 KG/M2 | SYSTOLIC BLOOD PRESSURE: 116 MMHG

## 2019-12-18 DIAGNOSIS — O09.523 MULTIGRAVIDA OF ADVANCED MATERNAL AGE IN THIRD TRIMESTER: ICD-10-CM

## 2019-12-18 DIAGNOSIS — O34.219 HISTORY OF CESAREAN DELIVERY AFFECTING PREGNANCY: ICD-10-CM

## 2019-12-18 DIAGNOSIS — O09.93 HIGH-RISK PREGNANCY IN THIRD TRIMESTER: Primary | ICD-10-CM

## 2019-12-18 DIAGNOSIS — Z36.85 ANTENATAL SCREENING FOR STREPTOCOCCUS B: ICD-10-CM

## 2019-12-18 PROCEDURE — 0502F SUBSEQUENT PRENATAL CARE: CPT | Performed by: OBSTETRICS & GYNECOLOGY

## 2019-12-18 NOTE — PROGRESS NOTES
Chief Complaint   Patient presents with   • Routine Prenatal Visit     GBS today. pt states no c/o       HPI: Liv is a  currently at 35w6d who today reports the following:  Contractions - No; Leaking - No; Vaginal bleeding -  No; Swelling of extremities - No.    ROS:  GI: Nausea - No; Constipation - No; Diarrhea - No    Neuro: Headache - No; Visual change - No      EXAM:  Vitals: See prenatal flowsheet   Abdomen: See prenatal flowsheet   Urine glucose/protein: See prenatal flowsheet   Pelvic: See prenatal flowsheet   MDM:   Impression: 1. Supervision of high risk pregnancy  2. Previous C/S with planned repeat C/S  3. AMA   Tests done today: 1. GBS testing   Topics discussed: 1. Continue with PNV's  2. Prenatal labs reviewed  3. labor signs and symptoms   Tests scheduled today for her next visit:   none

## 2019-12-27 ENCOUNTER — ROUTINE PRENATAL (OUTPATIENT)
Dept: OBSTETRICS AND GYNECOLOGY | Facility: CLINIC | Age: 34
End: 2019-12-27

## 2019-12-27 VITALS — DIASTOLIC BLOOD PRESSURE: 60 MMHG | SYSTOLIC BLOOD PRESSURE: 110 MMHG | WEIGHT: 163.8 LBS | BODY MASS INDEX: 25.85 KG/M2

## 2019-12-27 DIAGNOSIS — O09.523 MULTIGRAVIDA OF ADVANCED MATERNAL AGE IN THIRD TRIMESTER: ICD-10-CM

## 2019-12-27 DIAGNOSIS — O34.219 HISTORY OF CESAREAN DELIVERY AFFECTING PREGNANCY: ICD-10-CM

## 2019-12-27 DIAGNOSIS — O09.93 HIGH-RISK PREGNANCY IN THIRD TRIMESTER: ICD-10-CM

## 2019-12-27 LAB — GP B STREP RRNA SPEC QL PROBE: NORMAL

## 2019-12-27 PROCEDURE — 0502F SUBSEQUENT PRENATAL CARE: CPT | Performed by: OBSTETRICS & GYNECOLOGY

## 2019-12-27 NOTE — PROGRESS NOTES
Chief Complaint   Patient presents with   • Routine Prenatal Visit     c/o cx and pressure       HPI: Liv is a  currently at 37w1d who today reports the following:  Contractions - YES - but less than 4/hour AND no associated change in vaginal discharge; Leaking - No; Vaginal bleeding -  No; Swelling of extremities - No.    ROS:  GI: Nausea - No; Constipation - No; Diarrhea - No    Neuro: Headache - No; Visual change - No      EXAM:  Vitals: See prenatal flowsheet   Abdomen: See prenatal flowsheet   Urine glucose/protein: See prenatal flowsheet   Pelvic: See prenatal flowsheet   MDM:   Impression: 1. Supervision of high risk pregnancy  2. Previous C/S with planned repeat C/S  3. AMA   Tests done today: 1. none   Topics discussed: 1. Continue with PNV's  2. Prenatal labs reviewed  3. labor signs and symptoms  4. fetal kick count instructions   Tests scheduled today for her next visit:   none

## 2019-12-31 ENCOUNTER — DOCUMENTATION (OUTPATIENT)
Dept: OBSTETRICS AND GYNECOLOGY | Facility: CLINIC | Age: 34
End: 2019-12-31

## 2019-12-31 PROBLEM — O99.820 GBS (GROUP B STREPTOCOCCUS CARRIER), +RV CULTURE, CURRENTLY PREGNANT: Status: ACTIVE | Noted: 2019-12-31

## 2020-01-03 ENCOUNTER — ROUTINE PRENATAL (OUTPATIENT)
Dept: OBSTETRICS AND GYNECOLOGY | Facility: CLINIC | Age: 35
End: 2020-01-03

## 2020-01-03 VITALS — BODY MASS INDEX: 26.04 KG/M2 | DIASTOLIC BLOOD PRESSURE: 74 MMHG | SYSTOLIC BLOOD PRESSURE: 118 MMHG | WEIGHT: 165 LBS

## 2020-01-03 DIAGNOSIS — O34.219 HISTORY OF CESAREAN DELIVERY AFFECTING PREGNANCY: ICD-10-CM

## 2020-01-03 DIAGNOSIS — O09.523 MULTIGRAVIDA OF ADVANCED MATERNAL AGE IN THIRD TRIMESTER: ICD-10-CM

## 2020-01-03 DIAGNOSIS — O09.93 HIGH-RISK PREGNANCY IN THIRD TRIMESTER: Primary | ICD-10-CM

## 2020-01-03 DIAGNOSIS — O99.820 GBS (GROUP B STREPTOCOCCUS CARRIER), +RV CULTURE, CURRENTLY PREGNANT: ICD-10-CM

## 2020-01-03 PROCEDURE — 0502F SUBSEQUENT PRENATAL CARE: CPT | Performed by: OBSTETRICS & GYNECOLOGY

## 2020-01-03 NOTE — PROGRESS NOTES
Chief Complaint   Patient presents with   • Routine Prenatal Visit     pt states no c/o       HPI: Liv is a  currently at 38w1d who today reports the following:  Contractions - No; Leaking - No; Vaginal bleeding -  No; Swelling of extremities - No.    ROS:  GI: Nausea - No; Constipation - No; Diarrhea - No    Neuro: Headache - No; Visual change - No      EXAM:  Vitals: See prenatal flowsheet   Abdomen: See prenatal flowsheet   Urine glucose/protein: See prenatal flowsheet   Pelvic: See prenatal flowsheet   MDM:   Impression: 1. Supervision of high risk pregnancy  2. Previous C/S with planned repeat C/S  3. AMA   Tests done today: 1. none   Topics discussed: 1. Continue with PNV's  2. Prenatal labs reviewed  3. labor signs and symptoms   Tests scheduled today for her next visit:   PAT on  and c/s on 1/10

## 2020-01-09 ENCOUNTER — APPOINTMENT (OUTPATIENT)
Dept: PREADMISSION TESTING | Facility: HOSPITAL | Age: 35
End: 2020-01-09

## 2020-01-09 ENCOUNTER — PREP FOR SURGERY (OUTPATIENT)
Dept: OTHER | Facility: HOSPITAL | Age: 35
End: 2020-01-09

## 2020-01-09 ENCOUNTER — ROUTINE PRENATAL (OUTPATIENT)
Dept: OBSTETRICS AND GYNECOLOGY | Facility: CLINIC | Age: 35
End: 2020-01-09

## 2020-01-09 VITALS — HEIGHT: 66 IN | BODY MASS INDEX: 26.33 KG/M2 | WEIGHT: 163.8 LBS

## 2020-01-09 VITALS — BODY MASS INDEX: 25.97 KG/M2 | SYSTOLIC BLOOD PRESSURE: 122 MMHG | WEIGHT: 164.6 LBS | DIASTOLIC BLOOD PRESSURE: 80 MMHG

## 2020-01-09 DIAGNOSIS — O09.523 MULTIGRAVIDA OF ADVANCED MATERNAL AGE IN THIRD TRIMESTER: ICD-10-CM

## 2020-01-09 DIAGNOSIS — O34.219 HISTORY OF CESAREAN DELIVERY AFFECTING PREGNANCY: Primary | ICD-10-CM

## 2020-01-09 DIAGNOSIS — O09.93 HIGH-RISK PREGNANCY IN THIRD TRIMESTER: Primary | ICD-10-CM

## 2020-01-09 DIAGNOSIS — O34.219 HISTORY OF CESAREAN DELIVERY AFFECTING PREGNANCY: ICD-10-CM

## 2020-01-09 LAB
ABO GROUP BLD: NORMAL
BLD GP AB SCN SERPL QL: NEGATIVE
DEPRECATED RDW RBC AUTO: 40.2 FL (ref 37–54)
ERYTHROCYTE [DISTWIDTH] IN BLOOD BY AUTOMATED COUNT: 12 % (ref 12.3–15.4)
HCT VFR BLD AUTO: 35.9 % (ref 34–46.6)
HGB BLD-MCNC: 12.1 G/DL (ref 12–15.9)
MCH RBC QN AUTO: 31 PG (ref 26.6–33)
MCHC RBC AUTO-ENTMCNC: 33.7 G/DL (ref 31.5–35.7)
MCV RBC AUTO: 92.1 FL (ref 79–97)
PLATELET # BLD AUTO: 246 10*3/MM3 (ref 140–450)
PMV BLD AUTO: 8.7 FL (ref 6–12)
RBC # BLD AUTO: 3.9 10*6/MM3 (ref 3.77–5.28)
RH BLD: POSITIVE
T&S EXPIRATION DATE: NORMAL
WBC NRBC COR # BLD: 6.94 10*3/MM3 (ref 3.4–10.8)

## 2020-01-09 PROCEDURE — 86850 RBC ANTIBODY SCREEN: CPT | Performed by: OBSTETRICS & GYNECOLOGY

## 2020-01-09 PROCEDURE — 0502F SUBSEQUENT PRENATAL CARE: CPT | Performed by: OBSTETRICS & GYNECOLOGY

## 2020-01-09 PROCEDURE — 86901 BLOOD TYPING SEROLOGIC RH(D): CPT | Performed by: OBSTETRICS & GYNECOLOGY

## 2020-01-09 PROCEDURE — 86900 BLOOD TYPING SEROLOGIC ABO: CPT | Performed by: OBSTETRICS & GYNECOLOGY

## 2020-01-09 PROCEDURE — 85027 COMPLETE CBC AUTOMATED: CPT | Performed by: OBSTETRICS & GYNECOLOGY

## 2020-01-09 PROCEDURE — 36415 COLL VENOUS BLD VENIPUNCTURE: CPT

## 2020-01-09 RX ORDER — PROMETHAZINE HYDROCHLORIDE 12.5 MG/1
12.5 TABLET ORAL EVERY 6 HOURS PRN
Status: CANCELLED | OUTPATIENT
Start: 2020-01-09

## 2020-01-09 RX ORDER — CLINDAMYCIN PHOSPHATE 900 MG/50ML
900 INJECTION INTRAVENOUS ONCE
Status: CANCELLED | OUTPATIENT
Start: 2020-01-09 | End: 2020-01-09

## 2020-01-09 RX ORDER — CARBOPROST TROMETHAMINE 250 UG/ML
250 INJECTION, SOLUTION INTRAMUSCULAR AS NEEDED
Status: CANCELLED | OUTPATIENT
Start: 2020-01-09

## 2020-01-09 RX ORDER — OXYTOCIN-SODIUM CHLORIDE 0.9% IV SOLN 30 UNIT/500ML 30-0.9/5 UT/ML-%
85 SOLUTION INTRAVENOUS ONCE
Status: CANCELLED | OUTPATIENT
Start: 2020-01-09 | End: 2020-01-09

## 2020-01-09 RX ORDER — PROMETHAZINE HYDROCHLORIDE 25 MG/ML
12.5 INJECTION, SOLUTION INTRAMUSCULAR; INTRAVENOUS EVERY 6 HOURS PRN
Status: CANCELLED | OUTPATIENT
Start: 2020-01-09

## 2020-01-09 RX ORDER — SODIUM CHLORIDE, SODIUM LACTATE, POTASSIUM CHLORIDE, CALCIUM CHLORIDE 600; 310; 30; 20 MG/100ML; MG/100ML; MG/100ML; MG/100ML
125 INJECTION, SOLUTION INTRAVENOUS CONTINUOUS
Status: CANCELLED | OUTPATIENT
Start: 2020-01-09

## 2020-01-09 RX ORDER — OXYTOCIN-SODIUM CHLORIDE 0.9% IV SOLN 30 UNIT/500ML 30-0.9/5 UT/ML-%
650 SOLUTION INTRAVENOUS ONCE
Status: CANCELLED | OUTPATIENT
Start: 2020-01-09 | End: 2020-01-09

## 2020-01-09 RX ORDER — TRISODIUM CITRATE DIHYDRATE AND CITRIC ACID MONOHYDRATE 500; 334 MG/5ML; MG/5ML
30 SOLUTION ORAL ONCE
Status: CANCELLED | OUTPATIENT
Start: 2020-01-09 | End: 2020-01-09

## 2020-01-09 RX ORDER — MISOPROSTOL 200 UG/1
800 TABLET ORAL AS NEEDED
Status: CANCELLED | OUTPATIENT
Start: 2020-01-09

## 2020-01-09 RX ORDER — SODIUM CHLORIDE 0.9 % (FLUSH) 0.9 %
3 SYRINGE (ML) INJECTION EVERY 12 HOURS SCHEDULED
Status: CANCELLED | OUTPATIENT
Start: 2020-01-09

## 2020-01-09 RX ORDER — PROMETHAZINE HYDROCHLORIDE 12.5 MG/1
12.5 SUPPOSITORY RECTAL EVERY 6 HOURS PRN
Status: CANCELLED | OUTPATIENT
Start: 2020-01-09

## 2020-01-09 RX ORDER — LIDOCAINE HYDROCHLORIDE 10 MG/ML
5 INJECTION, SOLUTION EPIDURAL; INFILTRATION; INTRACAUDAL; PERINEURAL AS NEEDED
Status: CANCELLED | OUTPATIENT
Start: 2020-01-09

## 2020-01-09 RX ORDER — SODIUM CHLORIDE 0.9 % (FLUSH) 0.9 %
10 SYRINGE (ML) INJECTION AS NEEDED
Status: CANCELLED | OUTPATIENT
Start: 2020-01-09

## 2020-01-09 RX ORDER — METHYLERGONOVINE MALEATE 0.2 MG/ML
200 INJECTION INTRAVENOUS AS NEEDED
Status: CANCELLED | OUTPATIENT
Start: 2020-01-09

## 2020-01-09 NOTE — DISCHARGE INSTRUCTIONS
What to know before your arrive:     -Do not eat, drink or chew gum after midnight the day before your procedure.    This also includes mints.   -Do not shave any part of your body including abdomen or pelvic are for two    days before your procedure.   -If you are taking a scheduled medication (insulin, blood pressure medicine,   antibiotics) please consult with your physician whether to take on the day of   surgery.   -Remove all jewelry including rings, wedding bands, and piercing before coming   to the hospital.   -Leave important valuables at home.   -Do not wear dark fingernail polish.   -Bring the following with you to the hospital:    -Picture ID and insurance, Medicare or Medicaid cards    -Co-pay/deductible required by insurance (Cash, Check, Credit Card)    -Copy of living will or power  document (if applicable)    -CPAP mask and tubing, not machine (if applicable)    -Skin prep instructions sheet    What to know the day of procedure:     -Park in the [facility], take elevator for first floor, exit to the right and  proceed through the doors to outside, follow the covered sidewalk to the  entrance of the [place], follow the hallway and signs to the [facility],  enter the [place] to your right BEFORE entering the 17[address] lobby.  Take the  elevators to the 3rd floor ([address] [place]).   -Leave unnecessary items in your vehicle, including your suitcase.  Your support  person or a family member can get it for you after your procedure.   -Check in at the reception desk in the lobby of the 3rd floor ([address] [place]).   -One person may accompany you to the pre-op/recovery area.  Please have  other family members wait in the waiting room.   -An anesthesiologist will meet with your prior to your procedure.   -After anesthesia has been initiated, one person may accompany you in the  operating room.   -No video cameras are permitted in the operating room; only still cameras,  Please.      What to  expect while you are in recovery:     -One person may stay with you while you are in recovery.   -If the baby is stable, he/she may visit to initiate breastfeeding & [facility].    THE FOLLOWING INFORMATION WAS PROVIDED TO PATIENT:     SECTION BOOKLET BY AARON  PAIN MANAGEMENT   [employer]    CHLORHEXIDINE GLUCONATE WIPES AND INSTRUCTIONS GIVEN TO PATIENT

## 2020-01-09 NOTE — H&P (VIEW-ONLY)
Liv Martinez  : 1985  MRN: [id number]  CSN: [id number]    History and Physical    Subjective   Liv Martinez is a 34 y.o. year old  with an Estimated Date of Delivery: 20 scheduled for  delivery due to previous C/S - declines .  Her placental location is anterior.  She is planning for sterilization at the time of the .    Prenatal care has been with Dr. Montgomery and then transfer of care to Dr. Zamarripa .  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 .    OB History    Para Term  AB Living   2 1 1 0 0 1   SAB TAB Ectopic Molar Multiple Live Births   0 0 0 0 0 1      # Outcome Date GA Lbr Pancho/2nd Weight Sex Delivery Anes PTL Lv   2 Current            1 Term 17 37w2d  3101 g (6 lb 13.4 oz) M CS-LTranv Spinal N CASA      Name: TREVON MARTINEZ      Apgar1: 8  Apgar5: 9      Obstetric Comments   FOB #1 : Pregnancy #1 (breech, child with craniosynostosis); #2     Past Medical History:   Diagnosis Date   • ADHD (attention deficit hyperactivity disorder)    • AMA (advanced maternal age) multigravida 35+ 2019   • History of abnormal cervical Pap smear    • IUD (intrauterine device) in place     Paragard REMOVED   • Wears contact lenses      Past Surgical History:   Procedure Laterality Date   • CERVICAL CONIZATION, LEEP  2010   •  SECTION N/A 2017    Procedure:  SECTION PRIMARY;  Surgeon: Ag Montgomery MD;  Location: [facility] LABOR DELIVERY;  Service:    • WISDOM TOOTH EXTRACTION         Current Outpatient Medications:   •  Calcium Carbonate Antacid (TUMS PO), Take  by mouth Daily As Needed., Disp: , Rfl:   •  famotidine (PEPCID) 20 MG tablet, Take 20 mg by mouth 2 (Two) Times a Day., Disp: , Rfl:   •  Prenatal Vit-Fe Fumarate-FA (PRENATAL VITAMIN PO), Take 1 tablet by mouth Daily., Disp: , Rfl:     Allergies    Allergen Reactions   • Penicillins Hives     HIVES   • Sulfa Antibiotics Hives     HIVES       Social History    Tobacco Use      Smoking status: Never Smoker      Smokeless tobacco: Never Used    Review of Systems   Constitutional: Negative for chills and fever.   HENT: Negative for congestion and sore throat.    Respiratory: Negative for shortness of breath and wheezing.    Cardiovascular: Negative for chest pain and palpitations.   Gastrointestinal: Negative for abdominal pain, nausea and vomiting.   Genitourinary: Negative for frequency, vaginal bleeding and vaginal pain.   Musculoskeletal: Negative for back pain and myalgias.   Neurological: Negative for dizziness, light-headedness and headaches.   Psychiatric/Behavioral: Negative for confusion. The patient is not nervous/anxious.          Objective   LMP 2019 (Exact Date)   General: well developed; well nourished  no acute distress   Heart: Not performed.   Lungs: breathing is unlabored   Abdomen: soft, non-tender; no masses  no umbilical or inguinal hernias are present  no hepato-splenomegaly     Prenatal Labs  Lab Results   Component Value Date    HGB 11.7 (L) 10/23/2019    RUBELLAABIGG Positive 06/10/2019    HEPBSAG Non-Reactive 06/10/2019    ABSCRN Negative 06/10/2019    WGA7AGE9 Non-Reactive 06/10/2019    HEPCVIRUSABY Non-Reactive 06/10/2019    GCT 97 10/23/2019    BLM9MDCT 104 2017    STREPGPB pos 2019       Recent Labs  Lab Results   Component Value Date    HGB 11.7 (L) 10/23/2019    HCT 34.5 10/23/2019    WBC 7.60 10/23/2019     10/23/2019           Assessment   1. IUP with an Estimated Date of Delivery: 20  2. Planned  section for previous C/S - declines      Plan   1. Repeat   2. ABx and DVT prophylaxis    Graciela Zamarripa MD  2020

## 2020-01-09 NOTE — PROGRESS NOTES
Chief Complaint   Patient presents with   • Routine Prenatal Visit     pre-op. cscetion 1/10/20. pt states no c/o       HPI: Liv is a  currently at 39w0d who today reports the following:  Contractions - No; Leaking - No; Vaginal bleeding -  No; Swelling of extremities - No.    ROS:  GI: Nausea - No; Constipation - No; Diarrhea - No    Neuro: Headache - No; Visual change - No      EXAM:  Vitals: See prenatal flowsheet   Abdomen: See prenatal flowsheet   Urine glucose/protein: See prenatal flowsheet   Pelvic: See prenatal flowsheet   MDM:   Impression: 1. Supervision of high risk pregnancy  2. Previous C/S with planned repeat C/S  3. AMA   Tests done today: 1. Cbc, type and screen PAT today    Topics discussed: 1. Continue with PNV's  2. Prenatal labs reviewed  3. none - she had no major complaints,questions or concerns   Tests scheduled today for her next visit:   c/s tomorrow. Will get here at 7am given pcn allergy

## 2020-01-09 NOTE — H&P
Liv Martinez  : 1985  MRN: [id number]  CSN: [id number]    History and Physical    Subjective   Liv Martinez is a 34 y.o. year old  with an Estimated Date of Delivery: 20 scheduled for  delivery due to previous C/S - declines .  Her placental location is anterior.  She is planning for sterilization at the time of the .    Prenatal care has been with Dr. Montgomery and then transfer of care to Dr. Zamarripa .  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 .    OB History    Para Term  AB Living   2 1 1 0 0 1   SAB TAB Ectopic Molar Multiple Live Births   0 0 0 0 0 1      # Outcome Date GA Lbr Pancho/2nd Weight Sex Delivery Anes PTL Lv   2 Current            1 Term 17 37w2d  3101 g (6 lb 13.4 oz) M CS-LTranv Spinal N CASA      Name: TREVON MARTINEZ      Apgar1: 8  Apgar5: 9      Obstetric Comments   FOB #1 : Pregnancy #1 (breech, child with craniosynostosis); #2     Past Medical History:   Diagnosis Date   • ADHD (attention deficit hyperactivity disorder)    • AMA (advanced maternal age) multigravida 35+ 2019   • History of abnormal cervical Pap smear    • IUD (intrauterine device) in place     Paragard REMOVED   • Wears contact lenses      Past Surgical History:   Procedure Laterality Date   • CERVICAL CONIZATION, LEEP  2010   •  SECTION N/A 2017    Procedure:  SECTION PRIMARY;  Surgeon: Ag Montgomery MD;  Location: [facility] LABOR DELIVERY;  Service:    • WISDOM TOOTH EXTRACTION         Current Outpatient Medications:   •  Calcium Carbonate Antacid (TUMS PO), Take  by mouth Daily As Needed., Disp: , Rfl:   •  famotidine (PEPCID) 20 MG tablet, Take 20 mg by mouth 2 (Two) Times a Day., Disp: , Rfl:   •  Prenatal Vit-Fe Fumarate-FA (PRENATAL VITAMIN PO), Take 1 tablet by mouth Daily., Disp: , Rfl:     Allergies    Allergen Reactions   • Penicillins Hives     HIVES   • Sulfa Antibiotics Hives     HIVES       Social History    Tobacco Use      Smoking status: Never Smoker      Smokeless tobacco: Never Used    Review of Systems   Constitutional: Negative for chills and fever.   HENT: Negative for congestion and sore throat.    Respiratory: Negative for shortness of breath and wheezing.    Cardiovascular: Negative for chest pain and palpitations.   Gastrointestinal: Negative for abdominal pain, nausea and vomiting.   Genitourinary: Negative for frequency, vaginal bleeding and vaginal pain.   Musculoskeletal: Negative for back pain and myalgias.   Neurological: Negative for dizziness, light-headedness and headaches.   Psychiatric/Behavioral: Negative for confusion. The patient is not nervous/anxious.          Objective   LMP 2019 (Exact Date)   General: well developed; well nourished  no acute distress   Heart: Not performed.   Lungs: breathing is unlabored   Abdomen: soft, non-tender; no masses  no umbilical or inguinal hernias are present  no hepato-splenomegaly     Prenatal Labs  Lab Results   Component Value Date    HGB 11.7 (L) 10/23/2019    RUBELLAABIGG Positive 06/10/2019    HEPBSAG Non-Reactive 06/10/2019    ABSCRN Negative 06/10/2019    EQV4RHV2 Non-Reactive 06/10/2019    HEPCVIRUSABY Non-Reactive 06/10/2019    GCT 97 10/23/2019    LAT6HGQN 104 2017    STREPGPB pos 2019       Recent Labs  Lab Results   Component Value Date    HGB 11.7 (L) 10/23/2019    HCT 34.5 10/23/2019    WBC 7.60 10/23/2019     10/23/2019           Assessment   1. IUP with an Estimated Date of Delivery: 20  2. Planned  section for previous C/S - declines      Plan   1. Repeat   2. ABx and DVT prophylaxis    Graciela Zamarripa MD  2020

## 2020-01-10 ENCOUNTER — HOSPITAL ENCOUNTER (INPATIENT)
Facility: HOSPITAL | Age: 35
LOS: 2 days | Discharge: HOME | End: 2020-01-12
Attending: OBSTETRICS & GYNECOLOGY | Admitting: OBSTETRICS & GYNECOLOGY

## 2020-01-10 ENCOUNTER — ANESTHESIA (OUTPATIENT)
Dept: LABOR AND DELIVERY | Facility: HOSPITAL | Age: 35
End: 2020-01-10

## 2020-01-10 ENCOUNTER — ANESTHESIA EVENT (OUTPATIENT)
Dept: LABOR AND DELIVERY | Facility: HOSPITAL | Age: 35
End: 2020-01-10

## 2020-01-10 DIAGNOSIS — O34.219 PREVIOUS CESAREAN DELIVERY AFFECTING PREGNANCY: Primary | ICD-10-CM

## 2020-01-10 DIAGNOSIS — O34.219 HISTORY OF CESAREAN DELIVERY AFFECTING PREGNANCY: ICD-10-CM

## 2020-01-10 PROBLEM — O99.820 GBS (GROUP B STREPTOCOCCUS CARRIER), +RV CULTURE, CURRENTLY PREGNANT: Status: RESOLVED | Noted: 2019-12-31 | Resolved: 2020-01-10

## 2020-01-10 PROBLEM — N91.2 AMENORRHEA: Status: RESOLVED | Noted: 2019-05-11 | Resolved: 2020-01-10

## 2020-01-10 PROBLEM — O09.90 HIGH-RISK PREGNANCY: Status: RESOLVED | Noted: 2019-10-20 | Resolved: 2020-01-10

## 2020-01-10 PROBLEM — O09.523 MULTIGRAVIDA OF ADVANCED MATERNAL AGE IN THIRD TRIMESTER: Status: RESOLVED | Noted: 2019-10-20 | Resolved: 2020-01-10

## 2020-01-10 PROCEDURE — 25010000003 MORPHINE PER 10 MG: Performed by: NURSE ANESTHETIST, CERTIFIED REGISTERED

## 2020-01-10 PROCEDURE — 59025 FETAL NON-STRESS TEST: CPT

## 2020-01-10 PROCEDURE — 25010000002 ONDANSETRON PER 1 MG: Performed by: NURSE ANESTHETIST, CERTIFIED REGISTERED

## 2020-01-10 PROCEDURE — 25010000002 PROMETHAZINE PER 50 MG: Performed by: OBSTETRICS & GYNECOLOGY

## 2020-01-10 PROCEDURE — 25010000002 GENTAMICIN PER 80 MG: Performed by: OBSTETRICS & GYNECOLOGY

## 2020-01-10 PROCEDURE — 25010000002 FENTANYL CITRATE (PF) 100 MCG/2ML SOLUTION: Performed by: NURSE ANESTHETIST, CERTIFIED REGISTERED

## 2020-01-10 PROCEDURE — 59510 CESAREAN DELIVERY: CPT | Performed by: OBSTETRICS & GYNECOLOGY

## 2020-01-10 PROCEDURE — 25010000002 KETOROLAC TROMETHAMINE PER 15 MG: Performed by: OBSTETRICS & GYNECOLOGY

## 2020-01-10 PROCEDURE — 25010000002 MIDAZOLAM PER 1 MG: Performed by: NURSE ANESTHETIST, CERTIFIED REGISTERED

## 2020-01-10 RX ORDER — OXYTOCIN-SODIUM CHLORIDE 0.9% IV SOLN 30 UNIT/500ML 30-0.9/5 UT/ML-%
SOLUTION INTRAVENOUS CONTINUOUS PRN
Status: DISCONTINUED | OUTPATIENT
Start: 2020-01-10 | End: 2020-01-10 | Stop reason: SURG

## 2020-01-10 RX ORDER — IBUPROFEN 600 MG/1
600 TABLET ORAL EVERY 6 HOURS PRN
Status: DISCONTINUED | OUTPATIENT
Start: 2020-01-10 | End: 2020-01-12 | Stop reason: HOSPADM

## 2020-01-10 RX ORDER — OXYTOCIN-SODIUM CHLORIDE 0.9% IV SOLN 30 UNIT/500ML 30-0.9/5 UT/ML-%
85 SOLUTION INTRAVENOUS ONCE
Status: DISCONTINUED | OUTPATIENT
Start: 2020-01-10 | End: 2020-01-10 | Stop reason: HOSPADM

## 2020-01-10 RX ORDER — LANOLIN
CREAM (ML) TOPICAL
Status: DISCONTINUED | OUTPATIENT
Start: 2020-01-10 | End: 2020-01-12 | Stop reason: HOSPADM

## 2020-01-10 RX ORDER — SODIUM CHLORIDE 0.9 % (FLUSH) 0.9 %
3 SYRINGE (ML) INJECTION EVERY 12 HOURS SCHEDULED
Status: DISCONTINUED | OUTPATIENT
Start: 2020-01-10 | End: 2020-01-10 | Stop reason: HOSPADM

## 2020-01-10 RX ORDER — ONDANSETRON 2 MG/ML
INJECTION INTRAMUSCULAR; INTRAVENOUS AS NEEDED
Status: DISCONTINUED | OUTPATIENT
Start: 2020-01-10 | End: 2020-01-10 | Stop reason: SURG

## 2020-01-10 RX ORDER — ONDANSETRON 2 MG/ML
4 INJECTION INTRAMUSCULAR; INTRAVENOUS ONCE AS NEEDED
Status: COMPLETED | OUTPATIENT
Start: 2020-01-10 | End: 2020-01-10

## 2020-01-10 RX ORDER — DIPHENHYDRAMINE HYDROCHLORIDE 50 MG/ML
25 INJECTION INTRAMUSCULAR; INTRAVENOUS EVERY 4 HOURS PRN
Status: DISCONTINUED | OUTPATIENT
Start: 2020-01-10 | End: 2020-01-12 | Stop reason: HOSPADM

## 2020-01-10 RX ORDER — SODIUM CHLORIDE 0.9 % (FLUSH) 0.9 %
10 SYRINGE (ML) INJECTION AS NEEDED
Status: DISCONTINUED | OUTPATIENT
Start: 2020-01-10 | End: 2020-01-10 | Stop reason: HOSPADM

## 2020-01-10 RX ORDER — CARBOPROST TROMETHAMINE 250 UG/ML
250 INJECTION, SOLUTION INTRAMUSCULAR AS NEEDED
Status: DISCONTINUED | OUTPATIENT
Start: 2020-01-10 | End: 2020-01-10 | Stop reason: HOSPADM

## 2020-01-10 RX ORDER — HYDROXYZINE HYDROCHLORIDE 25 MG/1
50 TABLET, FILM COATED ORAL EVERY 6 HOURS PRN
Status: DISCONTINUED | OUTPATIENT
Start: 2020-01-10 | End: 2020-01-12 | Stop reason: HOSPADM

## 2020-01-10 RX ORDER — ONDANSETRON 4 MG/1
4 TABLET, FILM COATED ORAL EVERY 8 HOURS PRN
Status: DISCONTINUED | OUTPATIENT
Start: 2020-01-10 | End: 2020-01-12 | Stop reason: HOSPADM

## 2020-01-10 RX ORDER — PROMETHAZINE HYDROCHLORIDE 25 MG/ML
12.5 INJECTION, SOLUTION INTRAMUSCULAR; INTRAVENOUS EVERY 6 HOURS PRN
Status: DISCONTINUED | OUTPATIENT
Start: 2020-01-10 | End: 2020-01-10 | Stop reason: HOSPADM

## 2020-01-10 RX ORDER — CLINDAMYCIN PHOSPHATE 900 MG/50ML
900 INJECTION INTRAVENOUS ONCE
Status: COMPLETED | OUTPATIENT
Start: 2020-01-10 | End: 2020-01-10

## 2020-01-10 RX ORDER — MORPHINE SULFATE 0.5 MG/ML
INJECTION, SOLUTION EPIDURAL; INTRATHECAL; INTRAVENOUS AS NEEDED
Status: DISCONTINUED | OUTPATIENT
Start: 2020-01-10 | End: 2020-01-10 | Stop reason: SURG

## 2020-01-10 RX ORDER — SODIUM CHLORIDE, SODIUM LACTATE, POTASSIUM CHLORIDE, CALCIUM CHLORIDE 600; 310; 30; 20 MG/100ML; MG/100ML; MG/100ML; MG/100ML
125 INJECTION, SOLUTION INTRAVENOUS CONTINUOUS
Status: DISCONTINUED | OUTPATIENT
Start: 2020-01-10 | End: 2020-01-12 | Stop reason: HOSPADM

## 2020-01-10 RX ORDER — MISOPROSTOL 200 UG/1
800 TABLET ORAL AS NEEDED
Status: DISCONTINUED | OUTPATIENT
Start: 2020-01-10 | End: 2020-01-10 | Stop reason: HOSPADM

## 2020-01-10 RX ORDER — NALOXONE HCL 0.4 MG/ML
0.4 VIAL (ML) INJECTION
Status: ACTIVE | OUTPATIENT
Start: 2020-01-10 | End: 2020-01-11

## 2020-01-10 RX ORDER — TRISODIUM CITRATE DIHYDRATE AND CITRIC ACID MONOHYDRATE 500; 334 MG/5ML; MG/5ML
30 SOLUTION ORAL ONCE
Status: COMPLETED | OUTPATIENT
Start: 2020-01-10 | End: 2020-01-10

## 2020-01-10 RX ORDER — SIMETHICONE 80 MG
80 TABLET,CHEWABLE ORAL 4 TIMES DAILY PRN
Status: DISCONTINUED | OUTPATIENT
Start: 2020-01-10 | End: 2020-01-12 | Stop reason: HOSPADM

## 2020-01-10 RX ORDER — LIDOCAINE HYDROCHLORIDE 10 MG/ML
5 INJECTION, SOLUTION EPIDURAL; INFILTRATION; INTRACAUDAL; PERINEURAL AS NEEDED
Status: DISCONTINUED | OUTPATIENT
Start: 2020-01-10 | End: 2020-01-10 | Stop reason: HOSPADM

## 2020-01-10 RX ORDER — BUPIVACAINE HYDROCHLORIDE 7.5 MG/ML
INJECTION, SOLUTION INTRASPINAL AS NEEDED
Status: DISCONTINUED | OUTPATIENT
Start: 2020-01-10 | End: 2020-01-10 | Stop reason: SURG

## 2020-01-10 RX ORDER — DIPHENHYDRAMINE HCL 25 MG
25 CAPSULE ORAL EVERY 4 HOURS PRN
Status: DISCONTINUED | OUTPATIENT
Start: 2020-01-10 | End: 2020-01-12 | Stop reason: HOSPADM

## 2020-01-10 RX ORDER — METHYLERGONOVINE MALEATE 0.2 MG/ML
200 INJECTION INTRAVENOUS AS NEEDED
Status: DISCONTINUED | OUTPATIENT
Start: 2020-01-10 | End: 2020-01-10 | Stop reason: HOSPADM

## 2020-01-10 RX ORDER — PROMETHAZINE HYDROCHLORIDE 12.5 MG/1
12.5 TABLET ORAL EVERY 6 HOURS PRN
Status: DISCONTINUED | OUTPATIENT
Start: 2020-01-10 | End: 2020-01-10 | Stop reason: HOSPADM

## 2020-01-10 RX ORDER — KETOROLAC TROMETHAMINE 30 MG/ML
30 INJECTION, SOLUTION INTRAMUSCULAR; INTRAVENOUS ONCE AS NEEDED
Status: DISCONTINUED | OUTPATIENT
Start: 2020-01-10 | End: 2020-01-10 | Stop reason: HOSPADM

## 2020-01-10 RX ORDER — OXYTOCIN 10 [USP'U]/ML
10 INJECTION, SOLUTION INTRAMUSCULAR; INTRAVENOUS ONCE
Status: COMPLETED | OUTPATIENT
Start: 2020-01-10 | End: 2020-01-10

## 2020-01-10 RX ORDER — KETOROLAC TROMETHAMINE 30 MG/ML
30 INJECTION, SOLUTION INTRAMUSCULAR; INTRAVENOUS EVERY 6 HOURS
Status: COMPLETED | OUTPATIENT
Start: 2020-01-10 | End: 2020-01-11

## 2020-01-10 RX ORDER — MIDAZOLAM HYDROCHLORIDE 1 MG/ML
INJECTION INTRAMUSCULAR; INTRAVENOUS AS NEEDED
Status: DISCONTINUED | OUTPATIENT
Start: 2020-01-10 | End: 2020-01-10 | Stop reason: SURG

## 2020-01-10 RX ORDER — PROMETHAZINE HYDROCHLORIDE 25 MG/ML
12.5 INJECTION, SOLUTION INTRAMUSCULAR; INTRAVENOUS EVERY 6 HOURS PRN
Status: DISCONTINUED | OUTPATIENT
Start: 2020-01-10 | End: 2020-01-12 | Stop reason: HOSPADM

## 2020-01-10 RX ORDER — PROMETHAZINE HYDROCHLORIDE 12.5 MG/1
12.5 SUPPOSITORY RECTAL EVERY 6 HOURS PRN
Status: DISCONTINUED | OUTPATIENT
Start: 2020-01-10 | End: 2020-01-10 | Stop reason: HOSPADM

## 2020-01-10 RX ORDER — DOCUSATE SODIUM 100 MG/1
100 CAPSULE, LIQUID FILLED ORAL 2 TIMES DAILY PRN
Status: DISCONTINUED | OUTPATIENT
Start: 2020-01-10 | End: 2020-01-12 | Stop reason: HOSPADM

## 2020-01-10 RX ORDER — HYDROCODONE BITARTRATE AND ACETAMINOPHEN 5; 325 MG/1; MG/1
1 TABLET ORAL EVERY 4 HOURS PRN
Status: DISCONTINUED | OUTPATIENT
Start: 2020-01-10 | End: 2020-01-10 | Stop reason: HOSPADM

## 2020-01-10 RX ORDER — PRENATAL VIT/IRON FUM/FOLIC AC 27MG-0.8MG
1 TABLET ORAL DAILY
Status: DISCONTINUED | OUTPATIENT
Start: 2020-01-10 | End: 2020-01-12 | Stop reason: HOSPADM

## 2020-01-10 RX ORDER — OXYTOCIN-SODIUM CHLORIDE 0.9% IV SOLN 30 UNIT/500ML 30-0.9/5 UT/ML-%
650 SOLUTION INTRAVENOUS ONCE
Status: DISCONTINUED | OUTPATIENT
Start: 2020-01-10 | End: 2020-01-10 | Stop reason: HOSPADM

## 2020-01-10 RX ORDER — FAMOTIDINE 10 MG/ML
INJECTION, SOLUTION INTRAVENOUS AS NEEDED
Status: DISCONTINUED | OUTPATIENT
Start: 2020-01-10 | End: 2020-01-10 | Stop reason: SURG

## 2020-01-10 RX ORDER — FENTANYL CITRATE 50 UG/ML
INJECTION, SOLUTION INTRAMUSCULAR; INTRAVENOUS AS NEEDED
Status: DISCONTINUED | OUTPATIENT
Start: 2020-01-10 | End: 2020-01-10 | Stop reason: SURG

## 2020-01-10 RX ORDER — OXYCODONE HYDROCHLORIDE AND ACETAMINOPHEN 5; 325 MG/1; MG/1
1 TABLET ORAL EVERY 4 HOURS PRN
Status: DISCONTINUED | OUTPATIENT
Start: 2020-01-10 | End: 2020-01-12 | Stop reason: HOSPADM

## 2020-01-10 RX ADMIN — FAMOTIDINE 20 MG: 10 INJECTION, SOLUTION INTRAVENOUS at 09:08

## 2020-01-10 RX ADMIN — CLINDAMYCIN PHOSPHATE 900 MG: 900 INJECTION, SOLUTION INTRAVENOUS at 09:03

## 2020-01-10 RX ADMIN — FENTANYL CITRATE 15 MCG: 50 INJECTION, SOLUTION INTRAMUSCULAR; INTRAVENOUS at 09:13

## 2020-01-10 RX ADMIN — EPHEDRINE SULFATE 10 MG: 50 INJECTION INTRAMUSCULAR; INTRAVENOUS; SUBCUTANEOUS at 09:17

## 2020-01-10 RX ADMIN — OXYTOCIN 1000 ML/HR: 10 INJECTION INTRAVENOUS at 09:37

## 2020-01-10 RX ADMIN — SODIUM CHLORIDE, POTASSIUM CHLORIDE, SODIUM LACTATE AND CALCIUM CHLORIDE: 600; 310; 30; 20 INJECTION, SOLUTION INTRAVENOUS at 09:39

## 2020-01-10 RX ADMIN — KETOROLAC TROMETHAMINE 30 MG: 30 INJECTION, SOLUTION INTRAMUSCULAR at 20:14

## 2020-01-10 RX ADMIN — ONDANSETRON 4 MG: 2 INJECTION INTRAMUSCULAR; INTRAVENOUS at 11:16

## 2020-01-10 RX ADMIN — SODIUM CHLORIDE, POTASSIUM CHLORIDE, SODIUM LACTATE AND CALCIUM CHLORIDE 1000 ML: 600; 310; 30; 20 INJECTION, SOLUTION INTRAVENOUS at 07:20

## 2020-01-10 RX ADMIN — MORPHINE SULFATE 0.15 MG: 0.5 INJECTION, SOLUTION EPIDURAL; INTRATHECAL; INTRAVENOUS at 09:13

## 2020-01-10 RX ADMIN — PROMETHAZINE HYDROCHLORIDE 12.5 MG: 25 INJECTION INTRAMUSCULAR; INTRAVENOUS at 14:41

## 2020-01-10 RX ADMIN — KETOROLAC TROMETHAMINE 30 MG: 30 INJECTION, SOLUTION INTRAMUSCULAR at 13:19

## 2020-01-10 RX ADMIN — OXYTOCIN 10 UNITS: 10 INJECTION, SOLUTION INTRAMUSCULAR; INTRAVENOUS at 11:38

## 2020-01-10 RX ADMIN — SODIUM CHLORIDE, POTASSIUM CHLORIDE, SODIUM LACTATE AND CALCIUM CHLORIDE 125 ML/HR: 600; 310; 30; 20 INJECTION, SOLUTION INTRAVENOUS at 09:03

## 2020-01-10 RX ADMIN — SODIUM CHLORIDE, POTASSIUM CHLORIDE, SODIUM LACTATE AND CALCIUM CHLORIDE 125 ML/HR: 600; 310; 30; 20 INJECTION, SOLUTION INTRAVENOUS at 14:50

## 2020-01-10 RX ADMIN — MIDAZOLAM 1 MG: 1 INJECTION INTRAMUSCULAR; INTRAVENOUS at 09:08

## 2020-01-10 RX ADMIN — SIMETHICONE CHEW TAB 80 MG 80 MG: 80 TABLET ORAL at 23:56

## 2020-01-10 RX ADMIN — SODIUM CITRATE AND CITRIC ACID MONOHYDRATE 30 ML: 500; 334 SOLUTION ORAL at 09:04

## 2020-01-10 RX ADMIN — BUPIVACAINE HYDROCHLORIDE IN DEXTROSE 1.5 ML: 7.5 INJECTION, SOLUTION SUBARACHNOID at 09:13

## 2020-01-10 RX ADMIN — GENTAMICIN SULFATE 330 MG: 40 INJECTION, SOLUTION INTRAMUSCULAR; INTRAVENOUS at 08:11

## 2020-01-10 RX ADMIN — ONDANSETRON 4 MG: 2 INJECTION INTRAMUSCULAR; INTRAVENOUS at 09:08

## 2020-01-10 NOTE — ANESTHESIA PREPROCEDURE EVALUATION
Anesthesia Evaluation     Patient summary reviewed and Nursing notes reviewed   NPO Solid Status: > 8 hours  NPO Liquid Status: > 8 hours           Airway   Mallampati: II  TM distance: >3 FB  Neck ROM: full  No difficulty expected  Dental      Pulmonary - negative pulmonary ROS   Cardiovascular - negative cardio ROS        Neuro/Psych- negative ROS  GI/Hepatic/Renal/Endo - negative ROS     Musculoskeletal (-) negative ROS    Abdominal    Substance History - negative use     OB/GYN    (+) Pregnant,         Other                        Anesthesia Plan    ASA 2     spinal and ITN       Anesthetic plan, all risks, benefits, and alternatives have been provided, discussed and informed consent has been obtained with: patient.

## 2020-01-10 NOTE — INTERVAL H&P NOTE
H&P created on 1/9/20 for the current admission was reviewed. The patient was examined and there are no changes to the H&P.     Will plan to proceed with R C/S.       Mouna Priest MD  January 10, 2020

## 2020-01-10 NOTE — LACTATION NOTE
01/10/20 1330   Maternal Information   Date of Referral 01/10/20   Person Making Referral nurse;patient   Maternal Reason for Referral   ( first baby for 18 months--good supply)   Maternal Assessment   Breast Size Issue none   Breast Shape Bilateral:;round   Breast Density Bilateral:;soft   Nipples Bilateral:;everted   Maternal Infant Feeding   Maternal Emotional State assist needed;tense   Infant Positioning cross-cradle;clutch/football  (changed from cross cradle to football hold)   Signs of Milk Transfer infant jaw motion present   Pain with Feeding no   Comfort Measures Before/During Feeding infant position adjusted;maternal position adjusted   Equipment Type   Breast Pump Type double electric, personal  (at home)   Reproductive Interventions   Breastfeeding Assistance assisted with positioning;feeding cue recognition promoted;feeding on demand promoted;support offered;infant latch-on verified   Breastfeeding Support diary/feeding log utilized;encouragement provided;lactation counseling provided   Coping/Psychosocial Interventions   Parent/Child Attachment Promotion cue recognition promoted;caring behavior modeled;face-to-face positioning promoted;skin-to-skin contact encouraged;positive reinforcement provided;strengths emphasized   Supportive Measures active listening utilized   Helped mom with latch and position and mom will work on these this afternoon. Teaching done, as documented under Education. To call for lactation services, if there are questions or concerns.

## 2020-01-10 NOTE — ANESTHESIA POSTPROCEDURE EVALUATION
Patient: Liv Houser    Procedure Summary     Date:  01/10/20 Room / Location:  [facility] LABOR DELIVERY   [facility]    Anesthesia Start:  906 Anesthesia Stop:      Procedure:   SECTION REPEAT (N/A Abdomen) Diagnosis:       History of  delivery affecting pregnancy      (History of  delivery affecting pregnancy [O34.219])    Surgeon:  Graciela Zamarripa MD Provider:  Delonte Aj DO    Anesthesia Type:  spinal, ITN ASA Status:  2          Anesthesia Type: spinal, ITN    Vitals  Vitals Value Taken Time   /62 1/10/2020 10:18 AM   Temp 97.7 °F (36.5 °C) 1/10/2020 10:18 AM   Pulse 68 1/10/2020 10:19 AM   Resp 16    SpO2 100 % 1/10/2020 10:20 AM   Vitals shown include unvalidated device data.        Post Anesthesia Care and Evaluation    Patient location during evaluation: bedside  Patient participation: complete - patient participated  Level of consciousness: awake and alert  Pain score: 0  Pain management: adequate  Airway patency: patent  Anesthetic complications: No anesthetic complications    Cardiovascular status: acceptable  Respiratory status: acceptable  Hydration status: acceptable

## 2020-01-10 NOTE — ANESTHESIA PROCEDURE NOTES
Spinal Block      Patient reassessed immediately prior to procedure    Indication:procedure for pain  Performed By  CRNA: Argelia Osman CRNA  Preanesthetic Checklist  Completed: patient identified, surgical consent, pre-op evaluation, timeout performed, IV checked, risks and benefits discussed and monitors and equipment checked  Spinal Block Prep:  Patient Position:sitting  Sterile Tech:cap, gloves, mask and sterile barriers  Prep:Betadine  Patient Monitoring:blood pressure monitoring, continuous pulse oximetry and EKG  Spinal Block Procedure  Approach:midline  Guidance:palpation technique  Location:L3-L4  Needle Type:Catrachita  Needle Gauge:25 G  Placement of Spinal needle event:cerebrospinal fluid aspirated  Paresthesia: no  Fluid Appearance:clear     Post Assessment  Patient Tolerance:patient tolerated the procedure well with no apparent complications  Complications no

## 2020-01-10 NOTE — OP NOTE
Jane Houser  : 1985  MRN: [id number]  CSN: [id number]     Section Operative Note    Pre-Operative Dx:   1. Intrauterine pregnancy at 39w1d weeks   2. Previous  section - declines    3. AMA      Postoperative dx:    1. Intrauterine pregnancy at 39w1d weeks 2.   Previous  section - declines    3. AMA        Procedure: Repeat  (LTCS)       Surgeon: Graciela Zamarripa MD   Assistant: Mouna Priest MD       Anesthesia: Spinal        EBL: 325 mls. (QBL)   IV Fluids: 1600 mls.   UOP: 300 mls.     Antibiotics: clindamycin 900 mgs and gentamycin 5mk/kg mgs     Infant:      Name:  [place]      Gender: male  infant    Weight: 3607 g (7 lb 15.2 oz)     Apgars: 8   @ 1 minute / 9   @ 5 minutes     Procedure Details:   After the patient was adequately anesthetized, she was sterilely prepped and draped in the dorsal supine left lateral tilt position. A Pfannenstiel incision was created sharply with the knife. It was carried down to the fascia with the Bovie.  The fascia was cut transversely with the knife and extended in a curvilinear fashion with scissors. The fascia was freed from its midline insertion superiorly and inferiorly. Rectus muscles were  in the midline. The peritoneum was sharply entered and a bladder flap was sharply created. The lower uterine segment was scored transversely with the knife. Clear amniotic fluid was seen. The infant's was in vertex presentation.  The head was delivered atraumatically. The mouth and nose were bulb suctioned.  The cord was clamped and the infant was handed to the delivery team which was in attendance. The placenta was spontaneously extracted. The uterus was exteriorized and wiped free of debris and clot. The uterine incision was closed with #1 chromic in a continuous running locking fashion. The bladder flap was reapproximated.    The uterus was returned to the abdomen. The paracolic gutters were cleared  of debris and clot. The fascia was closed with 0 PDS sewing in a running unlocked fashion. The subcutaneous tissue was copiously irrigated. Ladan's fascia was closed with 3-0 plain gut and the skin was closed with 4-0 monocryl subcuticularly.  Steri-Strips without Mastisol were applied.  All counts were correct.         Complications:   None      Disposition:   Mother to Mother Baby/Postpartum  in stable condition currently.   Baby to NBN  in stable condition currently.       Graciela Zamarripa MD  1/10/2020  10:21 AM

## 2020-01-10 NOTE — PLAN OF CARE
Problem:  Delivery (Adult,Obstetrics,Pediatric)  Goal: Signs and Symptoms of Listed Potential Problems Will be Absent, Minimized or Managed ( Delivery)  Outcome: Outcome(s) achieved  Flowsheets (Taken 1/10/2020 1101)  Problems Assessed ( Delivery): all  Problems Present ( Delivery): none

## 2020-01-10 NOTE — H&P
H&P created on 20 for the current admission was reviewed. The patient was examined and there are no changes to the H&P.    Will plan for repeat C/S - declines .       Mouna Priest MD  January 10, 2020

## 2020-01-10 NOTE — PLAN OF CARE
Problem: Breastfeeding (Adult,Obstetrics,Pediatric)  Intervention: Provide Support During Feeding Sessions  Flowsheets (Taken 1/10/2020 1330)  Parent/Child Attachment Promotion: cue recognition promoted; caring behavior modeled; face-to-face positioning promoted; skin-to-skin contact encouraged; positive reinforcement provided; strengths emphasized  Intervention: Promote Positive Maternal Experience  Flowsheets (Taken 1/10/2020 1330)  Supportive Measures: active listening utilized  Breastfeeding Support: diary/feeding log utilized; encouragement provided; lactation counseling provided  Intervention: Support Exclusive Breastfeeding Success  Flowsheets (Taken 1/10/2020 1330)  Breastfeeding Assistance: assisted with positioning; feeding cue recognition promoted; feeding on demand promoted; support offered; infant latch-on verified

## 2020-01-11 LAB
BASOPHILS # BLD AUTO: 0.03 10*3/MM3 (ref 0–0.2)
BASOPHILS NFR BLD AUTO: 0.4 % (ref 0–1.5)
DEPRECATED RDW RBC AUTO: 40.4 FL (ref 37–54)
EOSINOPHIL # BLD AUTO: 0.07 10*3/MM3 (ref 0–0.4)
EOSINOPHIL NFR BLD AUTO: 0.8 % (ref 0.3–6.2)
ERYTHROCYTE [DISTWIDTH] IN BLOOD BY AUTOMATED COUNT: 12.1 % (ref 12.3–15.4)
HCT VFR BLD AUTO: 29.2 % (ref 34–46.6)
HGB BLD-MCNC: 9.9 G/DL (ref 12–15.9)
IMM GRANULOCYTES # BLD AUTO: 0.03 10*3/MM3 (ref 0–0.05)
IMM GRANULOCYTES NFR BLD AUTO: 0.4 % (ref 0–0.5)
LYMPHOCYTES # BLD AUTO: 1.99 10*3/MM3 (ref 0.7–3.1)
LYMPHOCYTES NFR BLD AUTO: 23.5 % (ref 19.6–45.3)
MCH RBC QN AUTO: 31 PG (ref 26.6–33)
MCHC RBC AUTO-ENTMCNC: 33.9 G/DL (ref 31.5–35.7)
MCV RBC AUTO: 91.5 FL (ref 79–97)
MONOCYTES # BLD AUTO: 0.63 10*3/MM3 (ref 0.1–0.9)
MONOCYTES NFR BLD AUTO: 7.4 % (ref 5–12)
NEUTROPHILS # BLD AUTO: 5.73 10*3/MM3 (ref 1.7–7)
NEUTROPHILS NFR BLD AUTO: 67.5 % (ref 42.7–76)
NRBC BLD AUTO-RTO: 0 /100 WBC (ref 0–0.2)
PLATELET # BLD AUTO: 212 10*3/MM3 (ref 140–450)
PMV BLD AUTO: 9.2 FL (ref 6–12)
RBC # BLD AUTO: 3.19 10*6/MM3 (ref 3.77–5.28)
WBC NRBC COR # BLD: 8.48 10*3/MM3 (ref 3.4–10.8)

## 2020-01-11 PROCEDURE — 99024 POSTOP FOLLOW-UP VISIT: CPT | Performed by: OBSTETRICS & GYNECOLOGY

## 2020-01-11 PROCEDURE — 25010000002 KETOROLAC TROMETHAMINE PER 15 MG: Performed by: OBSTETRICS & GYNECOLOGY

## 2020-01-11 PROCEDURE — 85025 COMPLETE CBC W/AUTO DIFF WBC: CPT | Performed by: OBSTETRICS & GYNECOLOGY

## 2020-01-11 RX ADMIN — OXYCODONE HYDROCHLORIDE AND ACETAMINOPHEN 1 TABLET: 5; 325 TABLET ORAL at 22:12

## 2020-01-11 RX ADMIN — SIMETHICONE CHEW TAB 80 MG 80 MG: 80 TABLET ORAL at 20:47

## 2020-01-11 RX ADMIN — IBUPROFEN 600 MG: 600 TABLET, FILM COATED ORAL at 14:06

## 2020-01-11 RX ADMIN — PRENATAL VITAMINS-IRON FUMARATE 27 MG IRON-FOLIC ACID 0.8 MG TABLET 1 TABLET: at 08:31

## 2020-01-11 RX ADMIN — KETOROLAC TROMETHAMINE 30 MG: 30 INJECTION, SOLUTION INTRAMUSCULAR at 02:52

## 2020-01-11 RX ADMIN — SIMETHICONE CHEW TAB 80 MG 80 MG: 80 TABLET ORAL at 14:06

## 2020-01-11 RX ADMIN — DOCUSATE SODIUM 100 MG: 100 CAPSULE, LIQUID FILLED ORAL at 08:31

## 2020-01-11 RX ADMIN — SIMETHICONE CHEW TAB 80 MG 80 MG: 80 TABLET ORAL at 08:31

## 2020-01-11 RX ADMIN — IBUPROFEN 600 MG: 600 TABLET, FILM COATED ORAL at 20:44

## 2020-01-11 RX ADMIN — KETOROLAC TROMETHAMINE 30 MG: 30 INJECTION, SOLUTION INTRAMUSCULAR at 08:31

## 2020-01-11 RX ADMIN — SIMETHICONE CHEW TAB 80 MG 80 MG: 80 TABLET ORAL at 17:50

## 2020-01-11 NOTE — PROGRESS NOTES
[facility] [place]    2020    Name:Liv Houser    MR#:[id number]     PROGRESS NOTE:  Post-Op Day 1 S/P    HD:1    Subjective   34 y.o. yo Female  s/p CS at 39w1d doing well. Pain well controlled. Tolerating regular diet and having flatus. Lochia normal.     Patient Active Problem List   Diagnosis   • Family history of congenital anomaly   • Postpartum care following rLTCS 1/10/20 ([place])        Objective    Vitals  Temp:  Temp:  [97.5 °F (36.4 °C)-98.5 °F (36.9 °C)] 98.5 °F (36.9 °C)  Temp src: Oral  BP:  BP: ()/(51-97) 121/79  Pulse:  Heart Rate:  [48-70] 52  RR:   Resp:  [16] 16    General Awake, alert, no distress  Abdomen Soft, nondistended, fundus firm, is below umbilicus, appropriately tender  Incision  Intact, no erythema or exudate  Extremities Calves NT bilaterally     I/O last 3 completed shifts:  In: 800 [I.V.:800]  Out: 3225 [Urine:2900; Blood:325]    LABS:   Lab Results   Component Value Date    WBC 8.48 2020    HGB 9.9 (L) 2020    HCT 29.2 (L) 2020    MCV 91.5 2020     2020       Infant: male       Assessment   1.  POD 1 Repeat  Section    Plan: Doing well.  Routine postoperative care      Active Problems:   None      Mairo Pritchard MD  2020 10:06 AM

## 2020-01-11 NOTE — PLAN OF CARE
Pt's VSS. Fundus, lochia and incision WDL. Pt ambulated in room and hallway during shift without difficulty. Voiding spontaneously and passing flatus. Pain controlled with ibuprofen. Nursing every 3 hours and on-demand.

## 2020-01-11 NOTE — ANESTHESIA POSTPROCEDURE EVALUATION
Patient: Liv Houser    Procedure Summary     Date:  01/10/20 Room / Location:  [facility] LABOR DELIVERY   [facility]    Anesthesia Start:  906 Anesthesia Stop:      Procedure:   SECTION REPEAT (N/A Abdomen) Diagnosis:       History of  delivery affecting pregnancy      (History of  delivery affecting pregnancy [O34.219])    Surgeon:  Graciela Zamarripa MD Provider:  Delonte Aj DO    Anesthesia Type:  spinal, ITN ASA Status:  2          Anesthesia Type: spinal, ITN    Vitals  Vitals Value Taken Time   /73 2020 11:45 AM   Temp 97.8 °F (36.6 °C) 2020 11:45 AM   Pulse 49 2020 11:45 AM   Resp 16 2020 11:45 AM   SpO2 99 % 1/10/2020 11:44 AM   Vitals shown include unvalidated device data.        Post Anesthesia Care and Evaluation    Patient location during evaluation: bedside  Patient participation: complete - patient participated  Level of consciousness: awake and awake and alert  Pain score: 0  Pain management: satisfactory to patient  Airway patency: patent  Anesthetic complications: No anesthetic complications  PONV Status: none  Cardiovascular status: acceptable  Respiratory status: acceptable  Hydration status: acceptable  Post Neuraxial Block status: Motor and sensory function returned to baseline and No signs or symptoms of PDPH

## 2020-01-12 VITALS
HEIGHT: 67 IN | WEIGHT: 165 LBS | DIASTOLIC BLOOD PRESSURE: 78 MMHG | RESPIRATION RATE: 16 BRPM | SYSTOLIC BLOOD PRESSURE: 128 MMHG | BODY MASS INDEX: 25.9 KG/M2 | OXYGEN SATURATION: 98 % | HEART RATE: 53 BPM | TEMPERATURE: 97.8 F

## 2020-01-12 PROCEDURE — 99024 POSTOP FOLLOW-UP VISIT: CPT | Performed by: OBSTETRICS & GYNECOLOGY

## 2020-01-12 RX ORDER — PSEUDOEPHEDRINE HCL 30 MG
100 TABLET ORAL 2 TIMES DAILY PRN
Qty: 60 EACH | Refills: 1 | Status: SHIPPED | OUTPATIENT
Start: 2020-01-12 | End: 2020-06-26

## 2020-01-12 RX ORDER — OXYCODONE HYDROCHLORIDE AND ACETAMINOPHEN 5; 325 MG/1; MG/1
1 TABLET ORAL EVERY 6 HOURS PRN
Qty: 15 TABLET | Refills: 0 | Status: SHIPPED | OUTPATIENT
Start: 2020-01-12 | End: 2020-01-20

## 2020-01-12 RX ORDER — IBUPROFEN 600 MG/1
600 TABLET ORAL EVERY 6 HOURS PRN
Qty: 40 TABLET | Refills: 1 | Status: SHIPPED | OUTPATIENT
Start: 2020-01-12 | End: 2020-06-26

## 2020-01-12 RX ADMIN — PRENATAL VITAMINS-IRON FUMARATE 27 MG IRON-FOLIC ACID 0.8 MG TABLET 1 TABLET: at 08:10

## 2020-01-12 RX ADMIN — Medication: at 08:11

## 2020-01-12 RX ADMIN — DOCUSATE SODIUM 100 MG: 100 CAPSULE, LIQUID FILLED ORAL at 08:10

## 2020-01-12 RX ADMIN — IBUPROFEN 600 MG: 600 TABLET, FILM COATED ORAL at 09:45

## 2020-01-12 RX ADMIN — IBUPROFEN 600 MG: 600 TABLET, FILM COATED ORAL at 04:00

## 2020-01-12 RX ADMIN — SIMETHICONE CHEW TAB 80 MG 80 MG: 80 TABLET ORAL at 08:10

## 2020-01-12 NOTE — LACTATION NOTE
01/11/20 1825   Maternal Information   Date of Referral 01/11/20   Person Making Referral nurse;patient   Maternal Reason for Referral   (pain at beginning of feeding)   Maternal Assessment   Breast Size Issue none   Breast Shape Bilateral:;round   Breast Density Bilateral:;soft   Nipples Bilateral:;everted   Left Nipple Symptoms tender;redness   Right Nipple Symptoms tender;redness   Maternal Infant Feeding   Maternal Emotional State independent;relaxed   Infant Positioning cross-cradle   Signs of Milk Transfer infant jaw motion present   Pain with Feeding yes  (at the beginning of feeding)   Latch Assistance no   Equipment Type   Breast Pump Type double electric, hospital grade  (nursing initiated pumping with hospital pump)   Breast Pump Flange Type hard   Breast Pump Flange Size 24 mm   Reproductive Interventions   Breastfeeding Assistance support offered;feeding cue recognition promoted;feeding on demand promoted   Made just enough milk with first baby. Discussed pumping after feedings during the first 2 weeks to try to increase milk supply. Reviewed pump use and encouraged to pump every 3 hours. Baby appeared well latched and nipple round after feeding. Discussed position and latch. Can try nipple shield--left medium and small in room for mom to try with next feeding. Teaching reviewed as documented under Education. To call for lactation services, if there are questions or concerns.

## 2020-01-12 NOTE — DISCHARGE SUMMARY
Discharge Summary    Date of Admission: 1/10/2020  Date of Discharge:  2020      Patient: Liv Houser      MR#:[id number]    Primary Surgeon/OB: Graciela Zamarripa MD    Discharge Surgeon/OB:    Presenting Problem/History of Present Illness  History of  delivery affecting pregnancy [O34.219]  Previous  delivery affecting pregnancy [O34.219]     Patient Active Problem List    Diagnosis   • Postpartum care following rLTCS 1/10/20 ([place]) [Z39.2]   • Family history of congenital anomaly [Z82.79]         Discharge Diagnosis:  section at 39w1d    Procedures:  , Low Transverse     1/10/2020    9:36 AM        Rh Immune globulin given: not applicable    Rubella vaccine given: no    Discharge Date: 2020; Discharge Time: 9:53 AM      Hospital Course  Patient is a 34 y.o. female  at 39w1d status post  section with uneventful postoperative recovery.  Patient was advanced to regular diet on postoperative day#1.  On discharge, ambulating, tolerating a regular diet without any difficulties and her incision is dry, clean and intact.     Infant:   male  fetus 3607 g (7 lb 15.2 oz)  with Apgar scores of 8  , 9   at five minutes.    Condition on Discharge:  Stable    Vital Signs  Temp:  [97.8 °F (36.6 °C)-98.2 °F (36.8 °C)] 97.8 °F (36.6 °C)  Heart Rate:  [49-61] 53  Resp:  [16] 16  BP: (122-130)/(62-82) 128/78    Lab Results   Component Value Date    WBC 8.48 2020    HGB 9.9 (L) 2020    HCT 29.2 (L) 2020    MCV 91.5 2020     2020       Discharge Disposition  Home or Self Care    Discharge Medications     Discharge Medications      New Medications      Instructions Start Date   docusate sodium 100 MG capsule   100 mg, Oral, 2 Times Daily PRN      ibuprofen 600 MG tablet  Commonly known as:  ADVIL,MOTRIN   600 mg, Oral, Every 6 Hours PRN      oxyCODONE-acetaminophen 5-325 MG per tablet  Commonly known as:  PERCOCET   1 tablet, Oral,  Every 6 Hours PRN         Continue These Medications      Instructions Start Date   PRENATAL VITAMIN PO   1 tablet, Oral, Daily         Stop These Medications    famotidine 20 MG tablet  Commonly known as:  PEPCID     TUMS PO            Discharge Diet: Regular    Activity at Discharge: Routine instructions given    Follow-up Appointments  Future Appointments   Date Time Provider Department Center   1/24/2020  3:30 PM Graciela Zamarripa MD [facility] None         Mario Pritchard MD  01/12/20  9:52 AM  EST

## 2020-01-17 ENCOUNTER — DOCUMENTATION (OUTPATIENT)
Dept: PHYSICAL THERAPY | Facility: HOSPITAL | Age: 35
End: 2020-01-17

## 2020-01-17 DIAGNOSIS — M25.552 LEFT HIP PAIN: ICD-10-CM

## 2020-01-17 DIAGNOSIS — G89.29 CHRONIC SACROILIAC JOINT PAIN: Primary | ICD-10-CM

## 2020-01-17 DIAGNOSIS — M53.3 CHRONIC SACROILIAC JOINT PAIN: Primary | ICD-10-CM

## 2020-01-17 NOTE — THERAPY DISCHARGE NOTE
Outpatient Physical Therapy Discharge Summary         Patient Name: Liv Houser  : 1985  MRN: [id number]    Today's Date: 2020    Visit Dx:    ICD-10-CM ICD-9-CM   1. Chronic sacroiliac joint pain M53.3 724.6    G89.29 338.29   2. Left hip pain M25.552 719.45       PT OP Goals     Row Name 20 1500          PT Short Term Goals    STG Date to Achieve  19  -AC     STG 1  Client will be independent with HEP to improve strength and manage symptoms.  -AC     STG 1 Progress  Not Met  -AC     STG 2  Client will be independent with donning/doffing SIJ belt.  -AC     STG 2 Progress  Not Met  -AC     STG 3  Pt will report > or = 50% improvement in symptoms.  -AC     STG 3 Progress  Not Met  -AC        Long Term Goals    LTG Date to Achieve  19  -AC     LTG 1  Client will be independent with comprehensive HEP.  -AC     LTG 1 Progress  Not Met  -AC     LTG 2  Pt will report > or = 75% improvement in symptoms.  -AC     LTG 2 Progress  Not Met  -AC     LTG 3  Pt will report < or = 3/10 pain getting in/out of chair and walking at work.  -AC     LTG 3 Progress  Not Met  -AC     LTG 4  Jason will reduce to < or = 14%.  -AC     LTG 4 Progress  Not Met  -AC        Time Calculation    PT Goal Re-Cert Due Date  20  -AC       User Key  (r) = Recorded By, (t) = Taken By, (c) = Cosigned By    Initials Name Provider Type    AC Jil Fish, PT Physical Therapist          OP PT Discharge Summary  Date of Discharge: 20  Reason for Discharge: Non-compliant, Change in medical status  Outcomes Achieved: Unable to make functional progress toward goals at this time  Discharge Destination: Home without follow-up  Discharge Instructions/Additional Comments: D/c due to lapse in treatment and change in medical status.      Time Calculation:   Start Time: 1500                Jil Fish, ANGELA  2020

## 2020-01-21 ENCOUNTER — TELEPHONE (OUTPATIENT)
Dept: OBSTETRICS AND GYNECOLOGY | Facility: CLINIC | Age: 35
End: 2020-01-21

## 2020-01-21 NOTE — TELEPHONE ENCOUNTER
bruno Abarca received a big bill from the genetic testing she had done back in august of $645.00. She wants to know why

## 2020-01-24 ENCOUNTER — POSTPARTUM VISIT (OUTPATIENT)
Dept: OBSTETRICS AND GYNECOLOGY | Facility: CLINIC | Age: 35
End: 2020-01-24

## 2020-01-24 ENCOUNTER — TELEPHONE (OUTPATIENT)
Dept: OBSTETRICS AND GYNECOLOGY | Facility: CLINIC | Age: 35
End: 2020-01-24

## 2020-01-24 VITALS
WEIGHT: 137.8 LBS | HEIGHT: 67 IN | BODY MASS INDEX: 21.63 KG/M2 | DIASTOLIC BLOOD PRESSURE: 80 MMHG | SYSTOLIC BLOOD PRESSURE: 120 MMHG

## 2020-01-24 DIAGNOSIS — R30.0 DYSURIA: Primary | ICD-10-CM

## 2020-01-24 PROCEDURE — 99024 POSTOP FOLLOW-UP VISIT: CPT | Performed by: OBSTETRICS & GYNECOLOGY

## 2020-01-24 RX ORDER — FLUCONAZOLE 150 MG/1
TABLET ORAL
COMMUNITY
Start: 2020-01-22 | End: 2020-06-26

## 2020-01-24 RX ORDER — LISDEXAMFETAMINE DIMESYLATE 60 MG/1
CAPSULE ORAL
COMMUNITY
Start: 2020-01-09 | End: 2020-06-26

## 2020-01-24 NOTE — PROGRESS NOTES
"Subjective   Chief Complaint   Patient presents with   • Postpartum Care     2w0d PP, csection, baby boy, breastfeeding. pt c/o dysuria. states that she took 2 days worth of macrobid that she had left over but is still having the burning.     Liv Houser is a 34 y.o. year old  presenting to be seen for her postpartum visit.  She had a Repeat  (LTCS).  Her son is doing well.    Since delivery she has not been sexually active.  She does not have concerns about post-partum blues/depression.   [place] Score = 2  She is breast feeding.  For ongoing contraception, her plans are IUD - Mirena.  Reports dysuria and already took diflucan and macrobid x2 days     The following portions of the patient's history were reviewed and updated as appropriate:current medications and allergies    Social History    Tobacco Use      Smoking status: Never Smoker      Smokeless tobacco: Never Used      Review of Systems  Constitutional POS: nothing reported    NEG: anorexia or night sweats   Genitourinary POS: nothing reported    NEG: dysuria or hematuria      Gastointestinal POS: nothing reported    NEG: bloating, change in bowel habits, melena or reflux symptoms   Breast POS: nothing reported    NEG: persistent breast lump, skin dimpling or nipple discharge        Objective   /80   Ht 170.2 cm (67\")   Wt 62.5 kg (137 lb 12.8 oz)   LMP 2019 (Exact Date)   Breastfeeding Yes   BMI 21.58 kg/m²     General:  well developed; well nourished  no acute distress   Abdomen: soft, non-tender; no masses  no umbilical or inguinal hernias are present  no hepato-splenomegaly  incision is clean, dry, intact and without drainage   Pelvis: Not performed.          Assessment   1. Normal 2 week postpartum exam  2. Dysuria      Plan   1. BC options reviewed and compared today: Mirena IUD  The following tests were ordered today: UA with culture if indicated.  It was explained to Liv that all lab test should be back " within the one week after they are performed. She will be notified about the results, regardless of the findings. If she has not been contacted by the office within 2 weeks after the test has been performed, it is her responsibility to contact us to learn about her results.  2. The importance of keeping all planned follow-up and taking all medications as prescribed was emphasized.  3. Follow up for postpartum visit in 4 weeks and iud placement at same time     No orders of the defined types were placed in this encounter.         This note was electronically signed.    Graciela Zamarripa MD  January 24, 2020    Note: Speech recognition transcription software may have been used to create portions of this document.  An attempt at proofreading has been made but errors in transcription could still be present.

## 2020-01-24 NOTE — TELEPHONE ENCOUNTER
Spoke to pt and she will print and bring paperwork with her as we have still not received anything form [employer].

## 2020-01-27 PROCEDURE — 87086 URINE CULTURE/COLONY COUNT: CPT | Performed by: OBSTETRICS & GYNECOLOGY

## 2020-01-27 PROCEDURE — 87186 SC STD MICRODIL/AGAR DIL: CPT | Performed by: OBSTETRICS & GYNECOLOGY

## 2020-01-27 PROCEDURE — 87088 URINE BACTERIA CULTURE: CPT | Performed by: OBSTETRICS & GYNECOLOGY

## 2020-01-27 PROCEDURE — 81001 URINALYSIS AUTO W/SCOPE: CPT | Performed by: OBSTETRICS & GYNECOLOGY

## 2020-01-28 LAB
BACTERIA UR QL AUTO: ABNORMAL /HPF
BILIRUB UR QL STRIP: NEGATIVE
CLARITY UR: CLEAR
COLOR UR: YELLOW
GLUCOSE UR STRIP-MCNC: NEGATIVE MG/DL
HGB UR QL STRIP.AUTO: NEGATIVE
HYALINE CASTS UR QL AUTO: ABNORMAL /LPF
KETONES UR QL STRIP: NEGATIVE
LEUKOCYTE ESTERASE UR QL STRIP.AUTO: ABNORMAL
NITRITE UR QL STRIP: POSITIVE
PH UR STRIP.AUTO: 6 [PH] (ref 5–8)
PROT UR QL STRIP: NEGATIVE
RBC # UR: ABNORMAL /HPF
REF LAB TEST METHOD: ABNORMAL
SP GR UR STRIP: 1.02 (ref 1–1.03)
SQUAMOUS #/AREA URNS HPF: ABNORMAL /HPF
UROBILINOGEN UR QL STRIP: ABNORMAL
WBC UR QL AUTO: ABNORMAL /HPF

## 2020-01-29 ENCOUNTER — TELEPHONE (OUTPATIENT)
Dept: OBSTETRICS AND GYNECOLOGY | Facility: CLINIC | Age: 35
End: 2020-01-29

## 2020-01-29 RX ORDER — NITROFURANTOIN 25; 75 MG/1; MG/1
100 CAPSULE ORAL 2 TIMES DAILY
Qty: 10 CAPSULE | Refills: 0 | Status: SHIPPED | OUTPATIENT
Start: 2020-01-29 | End: 2020-01-31 | Stop reason: SDUPTHER

## 2020-01-29 NOTE — TELEPHONE ENCOUNTER
Patient's urine culture did indicate an e coli UTI. Macrobid sent to her pharmacy.     New Medications Ordered This Visit   Medications   • nitrofurantoin, macrocrystal-monohydrate, (MACROBID) 100 MG capsule     Sig: Take 1 capsule by mouth 2 (Two) Times a Day for 5 days.     Dispense:  10 capsule     Refill:  0     Thanks  Graciela Zamarripa MD

## 2020-01-31 ENCOUNTER — TELEPHONE (OUTPATIENT)
Dept: OBSTETRICS AND GYNECOLOGY | Facility: CLINIC | Age: 35
End: 2020-01-31

## 2020-01-31 LAB
BACTERIA SPEC AEROBE CULT: ABNORMAL
BACTERIA SPEC AEROBE CULT: ABNORMAL

## 2020-01-31 RX ORDER — NITROFURANTOIN 25; 75 MG/1; MG/1
100 CAPSULE ORAL 2 TIMES DAILY
Qty: 10 CAPSULE | Refills: 0 | Status: SHIPPED | OUTPATIENT
Start: 2020-01-31 | End: 2020-02-05

## 2020-01-31 RX ORDER — PHENAZOPYRIDINE HYDROCHLORIDE 200 MG/1
200 TABLET, FILM COATED ORAL 3 TIMES DAILY PRN
Qty: 6 TABLET | Refills: 0 | Status: SHIPPED | OUTPATIENT
Start: 2020-01-31 | End: 2020-02-02

## 2020-01-31 NOTE — TELEPHONE ENCOUNTER
Called patient to review urine culture. Will do additional 5 days treatment given VRE and add pyridium as needed. She voiced understanding.   New Medications Ordered This Visit   Medications   • nitrofurantoin, macrocrystal-monohydrate, (MACROBID) 100 MG capsule     Sig: Take 1 capsule by mouth 2 (Two) Times a Day for 5 days.     Dispense:  10 capsule     Refill:  0   • phenazopyridine (PYRIDIUM) 200 MG tablet     Sig: Take 1 tablet by mouth 3 (Three) Times a Day As Needed for Bladder Spasms for up to 2 days.     Dispense:  6 tablet     Refill:  0     Graciela Zamarripa MD

## 2020-02-08 ENCOUNTER — TELEPHONE (OUTPATIENT)
Dept: OBSTETRICS AND GYNECOLOGY | Facility: CLINIC | Age: 35
End: 2020-02-08

## 2020-02-08 DIAGNOSIS — R30.0 DYSURIA: Primary | ICD-10-CM

## 2020-02-08 NOTE — TELEPHONE ENCOUNTER
Patient called reporting continued dysuria. Below is the listed urine culture. Reviewed literature and macrobid is appropriate treatment. Will have her leave a urine sample by Monday and if still growing VRE will contact ID. Reviewed worsening symptoms to contact me before then.     Urine Culture:   Lab Results   Component Value Date    URINECX >100,000 CFU/mL Escherichia coli (A) 01/27/2020    URINECX 50,000 CFU/mL Enterococcus faecalis, VRE (C) 01/27/2020     Orders Placed This Encounter   Procedures   • Urine Culture - Urine, Urine, Clean Catch     Graciela Zamarripa MD

## 2020-02-10 ENCOUNTER — APPOINTMENT (OUTPATIENT)
Dept: LAB | Facility: HOSPITAL | Age: 35
End: 2020-02-10

## 2020-02-10 PROCEDURE — 87086 URINE CULTURE/COLONY COUNT: CPT | Performed by: OBSTETRICS & GYNECOLOGY

## 2020-02-11 LAB — BACTERIA SPEC AEROBE CULT: NORMAL

## 2020-02-21 ENCOUNTER — POSTPARTUM VISIT (OUTPATIENT)
Dept: OBSTETRICS AND GYNECOLOGY | Facility: CLINIC | Age: 35
End: 2020-02-21

## 2020-02-21 VITALS
WEIGHT: 139.6 LBS | HEIGHT: 67 IN | SYSTOLIC BLOOD PRESSURE: 110 MMHG | DIASTOLIC BLOOD PRESSURE: 80 MMHG | BODY MASS INDEX: 21.91 KG/M2

## 2020-02-21 DIAGNOSIS — Z30.430 ENCOUNTER FOR IUD INSERTION: Primary | ICD-10-CM

## 2020-02-21 LAB
B-HCG UR QL: NEGATIVE
INTERNAL NEGATIVE CONTROL: NEGATIVE
INTERNAL POSITIVE CONTROL: POSITIVE
Lab: NORMAL

## 2020-02-21 PROCEDURE — 0503F POSTPARTUM CARE VISIT: CPT | Performed by: OBSTETRICS & GYNECOLOGY

## 2020-02-21 PROCEDURE — 81025 URINE PREGNANCY TEST: CPT | Performed by: OBSTETRICS & GYNECOLOGY

## 2020-02-21 PROCEDURE — 58300 INSERT INTRAUTERINE DEVICE: CPT | Performed by: OBSTETRICS & GYNECOLOGY

## 2020-02-21 NOTE — PROGRESS NOTES
"Subjective   Chief Complaint   Patient presents with   • Postpartum Care     6w0d PP, csection, breastfeeding. pt states no c/o   • Procedure     Mirena IUD insertion.     Liv Houser is a 34 y.o. year old  presenting to be seen for her postpartum visit.  She had a  (LTCS).  Her son is doing well.    Since delivery she has not been sexually active.  She does not have concerns about post-partum blues/depression.   [place] Score = 1  She is breast feeding .  For ongoing contraception, her plans are IUD - Mirena.    The following portions of the patient's history were reviewed and updated as appropriate:current medications and allergies    Social History    Tobacco Use      Smoking status: Never Smoker      Smokeless tobacco: Never Used      Review of Systems  Constitutional POS: nothing reported    NEG: anorexia or night sweats   Genitourinary POS: nothing reported    NEG: dysuria or hematuria      Gastointestinal POS: nothing reported    NEG: bloating, change in bowel habits, melena or reflux symptoms   Breast POS: nothing reported    NEG: persistent breast lump, skin dimpling or nipple discharge        Objective   /80   Ht 170.2 cm (67\")   Wt 63.3 kg (139 lb 9.6 oz)   LMP 2019 (Exact Date)   Breastfeeding Yes   BMI 21.86 kg/m²     General:  well developed; well nourished  no acute distress   Abdomen: soft, non-tender; no masses  no umbilical or inguinal hernias are present  no hepato-splenomegaly  incision is clean, dry, intact and without drainage   Pelvis: Clinical staff was present for exam  External genitalia:  normal appearance of the external genitalia including Bartholin's and Geddes's glands.  :  urethral meatus normal;  Vaginal:  normal pink mucosa without prolapse or lesions.  Cervix:  normal appearance.  Uterus:  normal size, shape and consistency.  Adnexa:  normal bimanual exam of the adnexa.  Rectal:  digital rectal exam not performed; anus visually normal " appearing.          Assessment   1. Normal 6 week postpartum exam     Plan   1. BC options reviewed and compared today: IUD - Mirena - placed today; see procedure note.   2. The importance of keeping all planned follow-up and taking all medications as prescribed was emphasized.  3. Follow up for annual exam in one year and iud string check in 6 weeks    No orders of the defined types were placed in this encounter.         This note was electronically signed.    Graciela Zamarripa MD  February 21, 2020    Note: Speech recognition transcription software may have been used to create portions of this document.  An attempt at proofreading has been made but errors in transcription could still be present.

## 2020-03-27 ENCOUNTER — TELEPHONE (OUTPATIENT)
Dept: OBSTETRICS AND GYNECOLOGY | Facility: CLINIC | Age: 35
End: 2020-03-27

## 2020-03-27 NOTE — TELEPHONE ENCOUNTER
Patient can be called and informed that her IUD string check appointment can be rescheduled for 1 to 2 months from now as long as she is having no issues.  She should be informed to try to check her strings herself if able.  If she has any questions or concerns and desires to do a telephone or telehealth visit I am happy to do that.    Thanks,   Graciela Zamarripa MD

## 2020-06-26 ENCOUNTER — OFFICE VISIT (OUTPATIENT)
Dept: OBSTETRICS AND GYNECOLOGY | Facility: CLINIC | Age: 35
End: 2020-06-26

## 2020-06-26 VITALS
HEIGHT: 67 IN | WEIGHT: 128 LBS | BODY MASS INDEX: 20.09 KG/M2 | SYSTOLIC BLOOD PRESSURE: 114 MMHG | DIASTOLIC BLOOD PRESSURE: 60 MMHG

## 2020-06-26 DIAGNOSIS — Z30.431 IUD CHECK UP: Primary | ICD-10-CM

## 2020-06-26 DIAGNOSIS — R59.0 AXILLARY LYMPHADENOPATHY: ICD-10-CM

## 2020-06-26 PROCEDURE — 99213 OFFICE O/P EST LOW 20 MIN: CPT | Performed by: OBSTETRICS & GYNECOLOGY

## 2020-06-26 NOTE — PROGRESS NOTES
"Subjective   Chief Complaint   Patient presents with   • Gynecologic Exam     string check, IUD placed 20     Liv Houser is a 34 y.o. year old  presenting to be seen for follow-up of her recently placed Mirena.  Since the time of insertion she has been sexually active.  Rison has not been painful for her.   Rison has not been painful for her partner. She reports no pain but does still have daily spotting.  Also reports issues with a lump in her right underarm that is persisted throughout the postpartum period.  She is currently still breast-feeding.    OTHER THINGS SHE WANTS TO DISCUSS TODAY:  Nothing else       Objective   /60   Ht 170.2 cm (67\")   Wt 58.1 kg (128 lb)   Breastfeeding Yes   BMI 20.05 kg/m²    Right axilla - 1-2cm mobile lymph node palpated.   Pelvic: NEFG - normal cervix with iud strings seen 1cm past external os  Imaging   No data reviewed       Assessment   1. Mirena IUD string seen  2. Right axillary lymph node     Plan   1. Reviewed to call the office if spotting continues over the next month or 2 and we can consider pelvic ultrasound  2. Right axillary ultrasound ordered  3. The importance of keeping all planned follow-up and taking all medications as prescribed was emphasized.  4. Follow up for annual exam in one year    No orders of the defined types were placed in this encounter.         Total time spent today with Liv  was 15 minutes.  Greater than 50% of the time was spent coordinating care, answering her questions and counseling regarding pathophysiology of her presenting problem along with plans for any diagnositc work-up and treatment.    This note was electronically signed.    Graciela Zamarripa MD  2020    Note: Speech recognition transcription software may have been used to create portions of this document.  An attempt at proofreading has been made but errors in transcription could still be present.  "

## 2020-08-05 ENCOUNTER — APPOINTMENT (OUTPATIENT)
Dept: MAMMOGRAPHY | Facility: HOSPITAL | Age: 35
End: 2020-08-05

## 2020-09-14 ENCOUNTER — HOSPITAL ENCOUNTER (OUTPATIENT)
Dept: ULTRASOUND IMAGING | Facility: HOSPITAL | Age: 35
Discharge: HOME | End: 2020-09-14

## 2020-09-14 ENCOUNTER — HOSPITAL ENCOUNTER (OUTPATIENT)
Dept: MAMMOGRAPHY | Facility: HOSPITAL | Age: 35
Discharge: HOME | End: 2020-09-14

## 2020-09-14 DIAGNOSIS — R59.0 AXILLARY LYMPHADENOPATHY: ICD-10-CM

## 2020-09-14 PROCEDURE — G0279 TOMOSYNTHESIS, MAMMO: HCPCS

## 2020-09-14 PROCEDURE — 77062 BREAST TOMOSYNTHESIS BI: CPT | Performed by: RADIOLOGY

## 2020-09-14 PROCEDURE — 76642 ULTRASOUND BREAST LIMITED: CPT

## 2020-09-14 PROCEDURE — 77066 DX MAMMO INCL CAD BI: CPT

## 2020-09-14 PROCEDURE — 77066 DX MAMMO INCL CAD BI: CPT | Performed by: RADIOLOGY

## 2020-09-14 PROCEDURE — 76642 ULTRASOUND BREAST LIMITED: CPT | Performed by: RADIOLOGY

## 2020-09-20 ENCOUNTER — TELEPHONE (OUTPATIENT)
Dept: OBSTETRICS AND GYNECOLOGY | Facility: CLINIC | Age: 35
End: 2020-09-20

## 2020-09-20 NOTE — TELEPHONE ENCOUNTER
Please inform patient I reviewed her recent breast imaging.     Her mammogram and right breast ultrasound was read as probably benign and they recommend follow up imaging in 6 months. The imaging center should contact her to schedule these. If she has not heard from them in the next 2 weeks, she should call our office.     Thanks,   Graciela Zamarripa MD

## 2020-12-17 ENCOUNTER — IMMUNIZATION (OUTPATIENT)
Dept: VACCINE CLINIC | Facility: HOSPITAL | Age: 35
End: 2020-12-17

## 2020-12-17 PROCEDURE — 91300 HC SARSCOV02 VAC 30MCG/0.3ML IM: CPT | Performed by: INTERNAL MEDICINE

## 2020-12-17 PROCEDURE — 0001A: CPT | Performed by: INTERNAL MEDICINE

## 2020-12-26 PROCEDURE — U0004 COV-19 TEST NON-CDC HGH THRU: HCPCS | Performed by: NURSE PRACTITIONER

## 2021-01-07 ENCOUNTER — IMMUNIZATION (OUTPATIENT)
Dept: VACCINE CLINIC | Facility: HOSPITAL | Age: 36
End: 2021-01-07

## 2021-01-07 PROCEDURE — 91300 HC SARSCOV02 VAC 30MCG/0.3ML IM: CPT | Performed by: INTERNAL MEDICINE

## 2021-01-07 PROCEDURE — 0002A: CPT | Performed by: INTERNAL MEDICINE

## 2021-01-07 PROCEDURE — 0001A: CPT | Performed by: INTERNAL MEDICINE

## 2021-03-09 ENCOUNTER — TRANSCRIBE ORDERS (OUTPATIENT)
Dept: MAMMOGRAPHY | Facility: HOSPITAL | Age: 36
End: 2021-03-09

## 2021-03-09 DIAGNOSIS — R92.8 ABNORMAL MAMMOGRAM: Primary | ICD-10-CM

## 2021-03-15 ENCOUNTER — HOSPITAL ENCOUNTER (OUTPATIENT)
Dept: ULTRASOUND IMAGING | Facility: HOSPITAL | Age: 36
Discharge: HOME | End: 2021-03-15
Admitting: OBSTETRICS & GYNECOLOGY

## 2021-03-15 DIAGNOSIS — R92.8 ABNORMAL MAMMOGRAM: ICD-10-CM

## 2021-03-15 PROCEDURE — 76642 ULTRASOUND BREAST LIMITED: CPT

## 2021-03-15 PROCEDURE — 76642 ULTRASOUND BREAST LIMITED: CPT | Performed by: RADIOLOGY

## 2021-03-17 DIAGNOSIS — R59.0 AXILLARY LYMPHADENOPATHY: Primary | ICD-10-CM

## 2021-03-17 DIAGNOSIS — N64.89 GALACTOCELE: ICD-10-CM

## 2021-10-04 ENCOUNTER — IMMUNIZATION (OUTPATIENT)
Dept: VACCINE CLINIC | Facility: HOSPITAL | Age: 36
End: 2021-10-04

## 2021-10-04 PROCEDURE — 0003A: CPT | Performed by: INTERNAL MEDICINE

## 2021-10-04 PROCEDURE — 0004A ADM SARSCOV2 30MCG/0.3ML BOOSTER: CPT | Performed by: INTERNAL MEDICINE

## 2021-10-04 PROCEDURE — 91300 HC SARSCOV02 VAC 30MCG/0.3ML IM: CPT | Performed by: INTERNAL MEDICINE

## 2022-03-07 PROBLEM — Z01.419 WELL WOMAN EXAM: Status: ACTIVE | Noted: 2022-03-07

## 2022-03-08 ENCOUNTER — OFFICE VISIT (OUTPATIENT)
Dept: OBSTETRICS AND GYNECOLOGY | Facility: CLINIC | Age: 37
End: 2022-03-08

## 2022-03-08 VITALS
SYSTOLIC BLOOD PRESSURE: 110 MMHG | WEIGHT: 136 LBS | DIASTOLIC BLOOD PRESSURE: 70 MMHG | BODY MASS INDEX: 21.35 KG/M2 | HEIGHT: 67 IN

## 2022-03-08 DIAGNOSIS — Z01.419 WELL WOMAN EXAM WITH ROUTINE GYNECOLOGICAL EXAM: Primary | ICD-10-CM

## 2022-03-08 PROCEDURE — 58301 REMOVE INTRAUTERINE DEVICE: CPT | Performed by: OBSTETRICS & GYNECOLOGY

## 2022-03-08 PROCEDURE — 99395 PREV VISIT EST AGE 18-39: CPT | Performed by: OBSTETRICS & GYNECOLOGY

## 2022-03-08 NOTE — PROGRESS NOTES
Subjective   Chief Complaint   Patient presents with   • Annual Exam     Liv Houser is a 36 y.o. year old  presenting to be seen for her annual exam.     SEXUAL Hx:  She is currently sexually active.  In the past year there there has been NO new sexual partners.    Condoms are never used.  She would not like to be screened for STD's at today's exam.  Current birth control method: IUD - Mirena.  She is happy with her current method of contraception and does not want to discuss alternative methods of contraception.  She does want the IUD removed because she desires to get pregnant soon.  MENSTRUAL Hx:  No LMP recorded. Patient has had an implant.  In the past 6 months her cycles have been absent.  Her menstrual flow is typically normal.   Each month on average there are roughly 0 day(s) of very heavy flow.    Intermenstrual bleeding is absent.    Post-coital bleeding is absent.  Dysmenorrhea: is not affecting her activities of daily living  PMS: is not affecting her activities of daily living  Her cycles are not a source of concern for her that she wishes to discuss today.  HEALTH Hx:  She exercises regularly: no (and has no plans to become more active).  She wears her seat belt: yes.  She has concerns about domestic violence: no.  OTHER THINGS SHE WANTS TO DISCUSS TODAY:  Nothing else    The following portions of the patient's history were reviewed and updated as appropriate:problem list, current medications, allergies, past family history, past medical history, past social history and past surgical history.    Social History    Tobacco Use      Smoking status: Never Smoker      Smokeless tobacco: Never Used    Review of Systems  Constitutional POS: nothing reported    NEG: anorexia or night sweats   Genitourinary POS: nothing reported    NEG: dysuria or hematuria      Gastointestinal POS: nothing reported    NEG: bloating, change in bowel habits, melena or reflux symptoms   Integument POS: nothing  "reported    NEG: moles that are changing in size, shape, color or rashes   Breast POS: nothing reported    NEG: persistent breast lump, skin dimpling or nipple discharge        Objective   /70 (BP Location: Left arm, Patient Position: Sitting, Cuff Size: Adult) Comment: 110/70  Ht 170.2 cm (67\")   Wt 61.7 kg (136 lb)   BMI 21.30 kg/m²     General:  well developed; well nourished  no acute distress   Skin:  No suspicious lesions seen   Thyroid: normal to inspection and palpation   Breasts:  Examined in supine position  Symmetric without masses or skin dimpling  Nipples normal without inversion, lesions or discharge  There are no palpable axillary nodes   Abdomen: soft, non-tender; no masses  no umbilical or inguinal hernias are present  no hepato-splenomegaly   Pelvis: Clinical staff was present for exam  External genitalia:  normal appearance of the external genitalia including Bartholin's and Menno's glands.  :  urethral meatus normal;  Vaginal:  normal pink mucosa without prolapse or lesions.  Cervix:  normal appearance. IUD string present - 1 cms in length;  Uterus:  normal size, shape and consistency.  Adnexa:  normal bimanual exam of the adnexa.  Rectal:  digital rectal exam not performed; anus visually normal appearing.        Assessment   1. Normal GYN exam  2. IUD (Mirena) in place - removed today      Plan   Pap and HPV were done today.  If she does not receive the results of the Pap within 2 weeks  time, she was instructed to call to find out the results.  I explained to Liv that the recommendations for Pap smear interval in a low risk patient's has lengthened to 5 years time if both cytology and HPV testing were normal.  I encouraged her to be seen yearly for a full physical exam including breast and pelvic exam even during the off years when PAP's will not be performed.  IUD removed - see procedure not  No prescription was given or electronically sent at today's visit  The importance of " keeping all planned follow-up and taking all medications as prescribed was emphasized.  Follow up for annual exam in ~ one year    No orders of the defined types were placed in this encounter.         This note was electronically signed.    Graciela Zamarripa MD  March 8, 2022

## 2022-03-08 NOTE — PROGRESS NOTES
IUD Removal    Date of procedure:  3/8/2022    Risks and benefits discussed? yes  All questions answered? yes  Consents given by The patient  Written consent obtained? yes  Reason for removal: Desires pregnancy    Local anesthesia used:  no    Procedure documentation:    A speculum was placed in order to view the cervix.  A tenaculum did not need to be placed on the anterior cervical lip.  Cervical dilation did not need to be performed in order to access the string.  The IUD string was easily seen.  The string was grasped and the IUD was removed without difficulty.  The IUD did not appear to be adherent to the uterine cavity. It was removed intact.    She tolerated the procedure without any difficulty.     Post procedure instructions: Patient notified to call with heavy bleeding, fever or increasing pain.    Follow up for annual exam in one year    This note was electronically signed.    Graciela Zamarripa MD  March 8, 2022

## 2022-03-11 DIAGNOSIS — Z01.419 WELL WOMAN EXAM WITH ROUTINE GYNECOLOGICAL EXAM: ICD-10-CM

## 2023-01-17 RX ORDER — LIFITEGRAST 50 MG/ML
1 SOLUTION/ DROPS OPHTHALMIC 2 TIMES DAILY
Qty: 180 EACH | Refills: 1 | Status: SHIPPED | OUTPATIENT
Start: 2023-01-17

## 2023-09-26 ENCOUNTER — OFFICE VISIT (OUTPATIENT)
Dept: FAMILY MEDICINE CLINIC | Facility: CLINIC | Age: 38
End: 2023-09-26
Payer: COMMERCIAL

## 2023-09-26 VITALS
HEIGHT: 67 IN | DIASTOLIC BLOOD PRESSURE: 72 MMHG | TEMPERATURE: 98.6 F | WEIGHT: 132 LBS | SYSTOLIC BLOOD PRESSURE: 116 MMHG | BODY MASS INDEX: 20.72 KG/M2 | OXYGEN SATURATION: 98 % | HEART RATE: 87 BPM

## 2023-09-26 DIAGNOSIS — Z00.00 ENCOUNTER FOR ANNUAL PHYSICAL EXAM: Primary | ICD-10-CM

## 2023-09-26 DIAGNOSIS — Z57.8 EMPLOYEE EXPOSURE TO BODY FLUIDS: ICD-10-CM

## 2023-09-26 RX ORDER — DEXTROAMPHETAMINE SACCHARATE, AMPHETAMINE ASPARTATE MONOHYDRATE, DEXTROAMPHETAMINE SULFATE AND AMPHETAMINE SULFATE 2.5; 2.5; 2.5; 2.5 MG/1; MG/1; MG/1; MG/1
30 CAPSULE, EXTENDED RELEASE ORAL EVERY MORNING
COMMUNITY

## 2023-09-26 SDOH — HEALTH STABILITY - PHYSICAL HEALTH: OCCUPATIONAL EXPOSURE TO OTHER RISK FACTORS: Z57.8

## 2023-09-26 NOTE — ASSESSMENT & PLAN NOTE
Annual wellness exam completed today. Health Maintenance including immunizations was updated and reflected in the chart. Yearly screening labs were ordered.     Further recommendations to be given once lab data received.     Health advice: healthy food choices with fresh fruits and vegetables, maintain sleep pattern at least 8 hours, avoid texting and distracted driving practices; wear safety belt, engage in regular exercise, maintain healthy weight, use safe sex practices, avoid alcohol and illicit drugs. Maintain immunizations that are up to date. Maintain health maintenance: PAP, etc.  Follow up with PCP if struggling with depression or anxiety. Keep regular dental and eye exams. Brush and floss teeth daily.     F/U annually and prn.

## 2023-09-26 NOTE — PROGRESS NOTES
Subjective     Chief Complaint  Establish Care (Pt was pt exposed, needs a rpr check)    Subjective          Liv Houser is a 38 y.o. female who presents today to [facility] for initial evaluation .    HPI:   History of Present Illness     is a very pleasant 38-year-old female who presents today to establish care with a primary care physician and annual physical exam.  She has not had baseline labs for a number of years.    She has a past medical history of ADHD.  She is prescribed Vyvanse for this by psychiatry.    She works in healthcare as a hospitalist. She unfortunately had contact with suspected bodily fluids (not blood) of a patient with syphilis.      The following portions of the patient's history were reviewed and updated as appropriate: allergies, current medications, past family history, past medical history, past social history, past surgical history and problem list.    Objective     Objective     Allergy:   Allergies   Allergen Reactions    Penicillins Hives     HIVES    Sulfa Antibiotics Hives     HIVES        Current Medications:   Current Outpatient Medications   Medication Sig Dispense Refill    amphetamine-dextroamphetamine XR (ADDERALL XR) 10 MG 24 hr capsule Take 3 capsules by mouth Every Morning      fluocinonide (LIDEX) 0.05 % gel Apply 1 application topically to the appropriate area as directed Daily As Needed. 60 g 1    Lifitegrast (Xiidra) 5 % ophthalmic solution Administer 1 drop to both eyes 2 (Two) Times a Day. 180 mL 2    lisdexamfetamine (Vyvanse) 30 MG capsule Take 1 capsule by mouth Every Morning. Take along with 40mg for a total dose of 70mg. 30 capsule 0    lisdexamfetamine (Vyvanse) 40 MG capsule Take 1 capsule by mouth Every Morning. Take along with 30mg for a total dose of 70mg. 30 capsule 0     No current facility-administered medications for this visit.       Past Medical History:  Past Medical History:   Diagnosis Date  "   Abnormal Pap smear of cervix     LEEP    ADHD (attention deficit hyperactivity disorder)     AMA (advanced maternal age) multigravida 35+ 2019    History of abnormal cervical Pap smear     IUD (intrauterine device) in place     Paragard REMOVED    Wears contact lenses        Past Surgical History:  Past Surgical History:   Procedure Laterality Date    CERVICAL CONIZATION, LEEP  2010     SECTION N/A 2017    Procedure:  SECTION PRIMARY;  Surgeon: Ag Montgomery MD;  Location:  [facility];  Service:      SECTION N/A 1/10/2020    Procedure:  SECTION REPEAT;  Surgeon: Graciela Zamarripa MD;  Location:  [facility];  Service: Obstetrics/Gynecology    WISDOM TOOTH EXTRACTION         Social History:  Social History     Socioeconomic History    Marital status:    Tobacco Use    Smoking status: Never     Passive exposure: Never    Smokeless tobacco: Never   Substance and Sexual Activity    Alcohol use: No    Drug use: No    Sexual activity: Yes     Partners: Male     Birth control/protection: I.U.D.     Comment: IUD placed 19       Family History:  Family History   Problem Relation Age of Onset    Breast cancer Maternal Aunt     Hyperlipidemia Father     Hypertension Mother     Diabetes Paternal Grandmother     Diabetes Maternal Grandmother     Stroke Maternal Grandfather     Ovarian cancer Neg Hx     Uterine cancer Neg Hx     Colon cancer Neg Hx     Osteoporosis Neg Hx        Vital Signs:   /72 (BP Location: Right arm, Patient Position: Sitting, Cuff Size: Adult)   Pulse 87   Temp 98.6 °F (37 °C) (Infrared)   Ht 170.2 cm (67\")   Wt 59.9 kg (132 lb)   SpO2 98%   BMI 20.67 kg/m²      Physical Exam:  Physical Exam  Constitutional:       Appearance: She is not ill-appearing.   Eyes:      Pupils: Pupils are equal, round, and reactive to light.   Cardiovascular:      Rate and Rhythm: Normal rate.      Pulses: Normal pulses.   Pulmonary:    "   Effort: Pulmonary effort is normal.      Breath sounds: Normal breath sounds.   Neurological:      General: No focal deficit present.      Mental Status: She is alert. Mental status is at baseline.   Psychiatric:         Mood and Affect: Mood normal.         Thought Content: Thought content normal.             PHQ-9 Score  PHQ-9 Total Score: 0     Lab Review  No visits with results within 3 Month(s) from this visit.   Latest known visit with results is:   Admission on 12/26/2020, Discharged on 12/26/2020   Component Date Value Ref Range Status    SARS-CoV-2 CASE 12/26/2020 Not Detected  Not Detected Final    Rapid Strep A Screen 12/26/2020 Negative  Negative, VALID, INVALID, Not Performed Final    Internal Control 12/26/2020 Passed  Passed Final    Lot Number 12/26/2020 [id number]   Final    Expiration Date 12/26/2020 121,223   Final    Rapid Influenza A Ag 12/26/2020 Negative  Negative Final    Rapid Influenza B Ag 12/26/2020 Negative  Negative Final    Internal Control 12/26/2020 Passed  Passed Final    Lot Number 12/26/2020 [id number]   Final    Expiration Date 12/26/2020 50,722   Final        Radiology Results        Assessment / Plan         Assessment and Plan   Diagnoses and all orders for this visit:    1. Encounter for annual physical exam (Primary)  Assessment & Plan:  Annual wellness exam completed today. Health Maintenance including immunizations was updated and reflected in the chart. Yearly screening labs were ordered.     Further recommendations to be given once lab data received.     Health advice: healthy food choices with fresh fruits and vegetables, maintain sleep pattern at least 8 hours, avoid texting and distracted driving practices; wear safety belt, engage in regular exercise, maintain healthy weight, use safe sex practices, avoid alcohol and illicit drugs. Maintain immunizations that are up to date. Maintain health maintenance: PAP, etc.  Follow up with PCP if struggling with depression or  anxiety. Keep regular dental and eye exams. Brush and floss teeth daily.     F/U annually and prn.       Orders:  -     CBC & Differential; Future  -     Comprehensive Metabolic Panel; Future  -     Hemoglobin A1c; Future  -     TSH Rfx On Abnormal To Free T4; Future  -     Vitamin D,25-Hydroxy; Future  -     Vitamin B12; Future  -     Lipid Panel; Future    2. Employee exposure to body fluids  -     RPR; Future        Discussed possible differential diagnoses, testing, treatment, recommended non-pharmacological interventions, risks, warning signs to monitor for that would indicate need for follow-up in clinic or ER. If no improvement with these regimens or you have new or worsening symptoms follow-up. Patient verbalizes understanding and agreement with plan of care. Denies further needs or concerns.     Patient was given instructions and counseling regarding her condition and for health maintenance advised.    BMI is within normal parameters. No other follow-up for BMI required.      Health Maintenance  Health Maintenance:   Health Maintenance Due   Topic Date Due    COVID-19 Vaccine (4 - Pfizer series) 11/29/2021        Meds ordered during this visit  No orders of the defined types were placed in this encounter.      Meds stopped during this visit:  Medications Discontinued During This Encounter   Medication Reason    levonorgestrel (MIRENA) 20 MCG/24HR IUD *Therapy completed    clindamycin (CLEOCIN T) 1 % external solution *Therapy completed    azithromycin (Zithromax Z-Doe) 250 MG tablet *Therapy completed    dextroamphetamine (DEXTROSTAT) 10 MG tablet *Therapy completed    amphetamine-dextroamphetamine (ADDERALL) 20 MG tablet *Therapy completed    azithromycin (ZITHROMAX) 250 MG tablet *Therapy completed    doxycycline (MONODOX) 100 MG capsule *Therapy completed    Amphet-Dextroamphet 3-Bead ER (Mydayis) 37.5 MG capsule sustained-release 24 hr *Therapy completed    fluocinonide (LIDEX) 0.05 % ointment *Therapy  completed    Amphet-Dextroamphet 3-Bead ER (Mydayis) 50 MG capsule sustained-release 24 hr *Therapy completed    dextroamphetamine (DEXTROSTAT) 10 MG tablet *Therapy completed    lisdexamfetamine (Vyvanse) 60 MG capsule *Therapy completed    Lifitegrast (XIIDRA) 5 % ophthalmic solution *Therapy completed    azithromycin (ZITHROMAX) 250 MG tablet *Therapy completed    buPROPion XL (WELLBUTRIN XL) 150 MG 24 hr tablet *Therapy completed    lisdexamfetamine (Vyvanse) 30 MG capsule *Therapy completed    lisdexamfetamine (Vyvanse) 40 MG capsule *Therapy completed    lisdexamfetamine (Vyvanse) 20 MG capsule *Therapy completed    lisdexamfetamine (Vyvanse) 50 MG capsule *Therapy completed    doxycycline (MONODOX) 100 MG capsule *Therapy completed    dextroamphetamine (DEXTROSTAT) 10 MG tablet *Therapy completed    doxycycline (VIBRAMYCIN) 100 MG capsule *Therapy completed    ondansetron (ZOFRAN) 4 MG tablet *Therapy completed    ondansetron ODT (ZOFRAN-ODT) 4 MG disintegrating tablet *Therapy completed    amphetamine-dextroamphetamine (Adderall) 20 MG tablet *Therapy completed    Lifitegrast (XIIDRA) 5 % ophthalmic solution *Therapy completed    azithromycin (Zithromax Z-Doe) 250 MG tablet *Therapy completed    doxycycline (ADOXA) 100 MG tablet *Therapy completed    amphetamine-dextroamphetamine XR (Adderall XR) 30 MG 24 hr capsule *Therapy completed    lisdexamfetamine (Vyvanse) 70 MG capsule *Therapy completed    Lifitegrast (Xiidra) 5 % ophthalmic solution *Therapy completed    Lifitegrast (Xiidra) 5 % ophthalmic solution *Therapy completed    amphetamine-dextroamphetamine XR (Adderall XR) 30 MG 24 hr capsule *Therapy completed        Visit Diagnoses    ICD-10-CM ICD-9-CM   1. Encounter for annual physical exam  Z00.00 V70.0   2. Employee exposure to body fluids  Z57.8 V15.85       Patient was given instructions and counseling regarding her condition or for health maintenance advice. Please see specific information  pulled into the AVS if appropriate.     Follow Up   Return in about 1 year (around 9/26/2024) for Recheck.      This document has been electronically signed by Vida Mcgee DO   September 26, 2023 17:40 EDT    Dictated Utilizing Dragon Dictation: Part of this note may be an electronic transcription/translation of spoken language to printed text using the Dragon Dictation System.    Vida Mcgee D.O.  [facility] [place] [facility] [place]

## 2023-09-28 ENCOUNTER — LAB (OUTPATIENT)
Dept: LAB | Facility: HOSPITAL | Age: 38
End: 2023-09-28
Payer: COMMERCIAL

## 2023-09-28 DIAGNOSIS — Z00.00 ENCOUNTER FOR ANNUAL PHYSICAL EXAM: ICD-10-CM

## 2023-09-28 DIAGNOSIS — Z57.8 EMPLOYEE EXPOSURE TO BODY FLUIDS: ICD-10-CM

## 2023-09-28 LAB
25(OH)D3 SERPL-MCNC: 46.9 NG/ML (ref 30–100)
ALBUMIN SERPL-MCNC: 4.7 G/DL (ref 3.5–5.2)
ALBUMIN/GLOB SERPL: 1.8 G/DL
ALP SERPL-CCNC: 69 U/L (ref 39–117)
ALT SERPL W P-5'-P-CCNC: 18 U/L (ref 1–33)
ANION GAP SERPL CALCULATED.3IONS-SCNC: 9 MMOL/L (ref 5–15)
AST SERPL-CCNC: 28 U/L (ref 1–32)
BASOPHILS # BLD AUTO: 0.05 10*3/MM3 (ref 0–0.2)
BASOPHILS NFR BLD AUTO: 0.9 % (ref 0–1.5)
BILIRUB SERPL-MCNC: 0.3 MG/DL (ref 0–1.2)
BUN SERPL-MCNC: 16 MG/DL (ref 6–20)
BUN/CREAT SERPL: 19.8 (ref 7–25)
CALCIUM SPEC-SCNC: 9.2 MG/DL (ref 8.6–10.5)
CHLORIDE SERPL-SCNC: 102 MMOL/L (ref 98–107)
CHOLEST SERPL-MCNC: 150 MG/DL (ref 0–200)
CO2 SERPL-SCNC: 25 MMOL/L (ref 22–29)
CREAT SERPL-MCNC: 0.81 MG/DL (ref 0.57–1)
DEPRECATED RDW RBC AUTO: 41.9 FL (ref 37–54)
EGFRCR SERPLBLD CKD-EPI 2021: 94.8 ML/MIN/1.73
EOSINOPHIL # BLD AUTO: 0.07 10*3/MM3 (ref 0–0.4)
EOSINOPHIL NFR BLD AUTO: 1.3 % (ref 0.3–6.2)
ERYTHROCYTE [DISTWIDTH] IN BLOOD BY AUTOMATED COUNT: 12.5 % (ref 12.3–15.4)
GLOBULIN UR ELPH-MCNC: 2.6 GM/DL
GLUCOSE SERPL-MCNC: 103 MG/DL (ref 65–99)
HBA1C MFR BLD: 5.6 % (ref 4.8–5.6)
HCT VFR BLD AUTO: 39.1 % (ref 34–46.6)
HDLC SERPL-MCNC: 89 MG/DL (ref 40–60)
HGB BLD-MCNC: 13.4 G/DL (ref 12–15.9)
IMM GRANULOCYTES # BLD AUTO: 0.01 10*3/MM3 (ref 0–0.05)
IMM GRANULOCYTES NFR BLD AUTO: 0.2 % (ref 0–0.5)
LDLC SERPL CALC-MCNC: 50 MG/DL (ref 0–100)
LDLC/HDLC SERPL: 0.58 {RATIO}
LYMPHOCYTES # BLD AUTO: 2.18 10*3/MM3 (ref 0.7–3.1)
LYMPHOCYTES NFR BLD AUTO: 40.5 % (ref 19.6–45.3)
MCH RBC QN AUTO: 31.5 PG (ref 26.6–33)
MCHC RBC AUTO-ENTMCNC: 34.3 G/DL (ref 31.5–35.7)
MCV RBC AUTO: 91.8 FL (ref 79–97)
MONOCYTES # BLD AUTO: 0.49 10*3/MM3 (ref 0.1–0.9)
MONOCYTES NFR BLD AUTO: 9.1 % (ref 5–12)
NEUTROPHILS NFR BLD AUTO: 2.58 10*3/MM3 (ref 1.7–7)
NEUTROPHILS NFR BLD AUTO: 48 % (ref 42.7–76)
NRBC BLD AUTO-RTO: 0 /100 WBC (ref 0–0.2)
PLATELET # BLD AUTO: 267 10*3/MM3 (ref 140–450)
PMV BLD AUTO: 9.7 FL (ref 6–12)
POTASSIUM SERPL-SCNC: 3.7 MMOL/L (ref 3.5–5.2)
PROT SERPL-MCNC: 7.3 G/DL (ref 6–8.5)
RBC # BLD AUTO: 4.26 10*6/MM3 (ref 3.77–5.28)
RPR SER QL: NORMAL
SODIUM SERPL-SCNC: 136 MMOL/L (ref 136–145)
TRIGL SERPL-MCNC: 49 MG/DL (ref 0–150)
TSH SERPL DL<=0.05 MIU/L-ACNC: 2.9 UIU/ML (ref 0.27–4.2)
VIT B12 BLD-MCNC: 544 PG/ML (ref 211–946)
VLDLC SERPL-MCNC: 11 MG/DL (ref 5–40)
WBC NRBC COR # BLD: 5.38 10*3/MM3 (ref 3.4–10.8)

## 2023-09-28 PROCEDURE — 36415 COLL VENOUS BLD VENIPUNCTURE: CPT

## 2023-09-28 PROCEDURE — 80061 LIPID PANEL: CPT

## 2023-09-28 PROCEDURE — 82306 VITAMIN D 25 HYDROXY: CPT

## 2023-09-28 PROCEDURE — 82607 VITAMIN B-12: CPT

## 2023-09-28 PROCEDURE — 83036 HEMOGLOBIN GLYCOSYLATED A1C: CPT

## 2023-09-28 PROCEDURE — 80050 GENERAL HEALTH PANEL: CPT

## 2023-09-28 PROCEDURE — 86592 SYPHILIS TEST NON-TREP QUAL: CPT

## 2023-09-28 SDOH — HEALTH STABILITY - PHYSICAL HEALTH: OCCUPATIONAL EXPOSURE TO OTHER RISK FACTORS: Z57.8

## 2024-07-29 RX ORDER — LIFITEGRAST 50 MG/ML
1 SOLUTION/ DROPS OPHTHALMIC 2 TIMES DAILY
Qty: 180 ML | Refills: 2 | Status: CANCELLED | OUTPATIENT
Start: 2024-07-29

## 2024-09-26 ENCOUNTER — OFFICE VISIT (OUTPATIENT)
Dept: FAMILY MEDICINE CLINIC | Facility: CLINIC | Age: 39
End: 2024-09-26
Payer: COMMERCIAL

## 2024-09-26 VITALS
OXYGEN SATURATION: 98 % | WEIGHT: 132.4 LBS | TEMPERATURE: 97.4 F | DIASTOLIC BLOOD PRESSURE: 60 MMHG | HEIGHT: 67 IN | BODY MASS INDEX: 20.78 KG/M2 | SYSTOLIC BLOOD PRESSURE: 100 MMHG | HEART RATE: 78 BPM

## 2024-09-26 DIAGNOSIS — Z00.00 ENCOUNTER FOR ANNUAL PHYSICAL EXAM: Primary | ICD-10-CM

## 2024-09-26 DIAGNOSIS — Z12.31 ENCOUNTER FOR SCREENING MAMMOGRAM FOR MALIGNANT NEOPLASM OF BREAST: ICD-10-CM

## 2024-09-26 DIAGNOSIS — Z23 IMMUNIZATION DUE: ICD-10-CM

## 2024-09-26 PROCEDURE — 99396 PREV VISIT EST AGE 40-64: CPT | Performed by: FAMILY MEDICINE

## 2024-09-26 PROCEDURE — 90471 IMMUNIZATION ADMIN: CPT | Performed by: FAMILY MEDICINE

## 2024-09-26 PROCEDURE — 90656 IIV3 VACC NO PRSV 0.5 ML IM: CPT | Performed by: FAMILY MEDICINE

## 2024-10-01 LAB
NCCN CRITERIA FLAG: NORMAL
TYRER CUZICK SCORE: 19.7

## 2024-10-02 DIAGNOSIS — Z12.31 ENCOUNTER FOR SCREENING MAMMOGRAM FOR MALIGNANT NEOPLASM OF BREAST: Primary | ICD-10-CM

## 2024-10-11 ENCOUNTER — LAB (OUTPATIENT)
Dept: LAB | Facility: HOSPITAL | Age: 39
End: 2024-10-11
Payer: COMMERCIAL

## 2024-10-11 DIAGNOSIS — Z00.00 ENCOUNTER FOR ANNUAL PHYSICAL EXAM: ICD-10-CM

## 2024-10-11 LAB
25(OH)D3 SERPL-MCNC: 48.2 NG/ML (ref 30–100)
ALBUMIN SERPL-MCNC: 4.5 G/DL (ref 3.5–5.2)
ALBUMIN/GLOB SERPL: 1.4 G/DL
ALP SERPL-CCNC: 86 U/L (ref 39–117)
ALT SERPL W P-5'-P-CCNC: 18 U/L (ref 1–33)
ANION GAP SERPL CALCULATED.3IONS-SCNC: 9.8 MMOL/L (ref 5–15)
AST SERPL-CCNC: 32 U/L (ref 1–32)
BASOPHILS # BLD AUTO: 0.04 10*3/MM3 (ref 0–0.2)
BASOPHILS NFR BLD AUTO: 0.7 % (ref 0–1.5)
BILIRUB SERPL-MCNC: 0.3 MG/DL (ref 0–1.2)
BUN SERPL-MCNC: 13 MG/DL (ref 6–20)
BUN/CREAT SERPL: 12.7 (ref 7–25)
CALCIUM SPEC-SCNC: 9.7 MG/DL (ref 8.6–10.5)
CHLORIDE SERPL-SCNC: 103 MMOL/L (ref 98–107)
CHOLEST SERPL-MCNC: 148 MG/DL (ref 0–200)
CO2 SERPL-SCNC: 25.2 MMOL/L (ref 22–29)
CREAT SERPL-MCNC: 1.02 MG/DL (ref 0.57–1)
DEPRECATED RDW RBC AUTO: 41.4 FL (ref 37–54)
EGFRCR SERPLBLD CKD-EPI 2021: 71.5 ML/MIN/1.73
EOSINOPHIL # BLD AUTO: 0.05 10*3/MM3 (ref 0–0.4)
EOSINOPHIL NFR BLD AUTO: 0.8 % (ref 0.3–6.2)
ERYTHROCYTE [DISTWIDTH] IN BLOOD BY AUTOMATED COUNT: 12.6 % (ref 12.3–15.4)
GLOBULIN UR ELPH-MCNC: 3.2 GM/DL
GLUCOSE SERPL-MCNC: 98 MG/DL (ref 65–99)
HBA1C MFR BLD: 5.4 % (ref 4.8–5.6)
HCT VFR BLD AUTO: 39 % (ref 34–46.6)
HDLC SERPL-MCNC: 88 MG/DL (ref 40–60)
HGB BLD-MCNC: 12.7 G/DL (ref 12–15.9)
IMM GRANULOCYTES # BLD AUTO: 0.01 10*3/MM3 (ref 0–0.05)
IMM GRANULOCYTES NFR BLD AUTO: 0.2 % (ref 0–0.5)
LDLC SERPL CALC-MCNC: 50 MG/DL (ref 0–100)
LDLC/HDLC SERPL: 0.58 {RATIO}
LYMPHOCYTES # BLD AUTO: 1.74 10*3/MM3 (ref 0.7–3.1)
LYMPHOCYTES NFR BLD AUTO: 29.3 % (ref 19.6–45.3)
MCH RBC QN AUTO: 29.8 PG (ref 26.6–33)
MCHC RBC AUTO-ENTMCNC: 32.6 G/DL (ref 31.5–35.7)
MCV RBC AUTO: 91.5 FL (ref 79–97)
MONOCYTES # BLD AUTO: 0.44 10*3/MM3 (ref 0.1–0.9)
MONOCYTES NFR BLD AUTO: 7.4 % (ref 5–12)
NEUTROPHILS NFR BLD AUTO: 3.66 10*3/MM3 (ref 1.7–7)
NEUTROPHILS NFR BLD AUTO: 61.6 % (ref 42.7–76)
NRBC BLD AUTO-RTO: 0 /100 WBC (ref 0–0.2)
PLATELET # BLD AUTO: 300 10*3/MM3 (ref 140–450)
PMV BLD AUTO: 9.5 FL (ref 6–12)
POTASSIUM SERPL-SCNC: 4.2 MMOL/L (ref 3.5–5.2)
PROT SERPL-MCNC: 7.7 G/DL (ref 6–8.5)
RBC # BLD AUTO: 4.26 10*6/MM3 (ref 3.77–5.28)
SODIUM SERPL-SCNC: 138 MMOL/L (ref 136–145)
TRIGL SERPL-MCNC: 44 MG/DL (ref 0–150)
TSH SERPL DL<=0.05 MIU/L-ACNC: 3.43 UIU/ML (ref 0.27–4.2)
VIT B12 BLD-MCNC: 584 PG/ML (ref 211–946)
VLDLC SERPL-MCNC: 10 MG/DL (ref 5–40)
WBC NRBC COR # BLD AUTO: 5.94 10*3/MM3 (ref 3.4–10.8)

## 2024-10-11 PROCEDURE — 80050 GENERAL HEALTH PANEL: CPT

## 2024-10-11 PROCEDURE — 83036 HEMOGLOBIN GLYCOSYLATED A1C: CPT

## 2024-10-11 PROCEDURE — 82306 VITAMIN D 25 HYDROXY: CPT

## 2024-10-11 PROCEDURE — 82607 VITAMIN B-12: CPT

## 2024-10-11 PROCEDURE — 80061 LIPID PANEL: CPT

## 2025-01-08 ENCOUNTER — HOSPITAL ENCOUNTER (OUTPATIENT)
Dept: MAMMOGRAPHY | Facility: HOSPITAL | Age: 40
Discharge: HOME | End: 2025-01-08
Payer: COMMERCIAL

## 2025-01-08 ENCOUNTER — HOSPITAL ENCOUNTER (OUTPATIENT)
Dept: ULTRASOUND IMAGING | Facility: HOSPITAL | Age: 40
Discharge: HOME | End: 2025-01-08
Payer: COMMERCIAL

## 2025-01-08 DIAGNOSIS — Z12.31 ENCOUNTER FOR SCREENING MAMMOGRAM FOR MALIGNANT NEOPLASM OF BREAST: ICD-10-CM

## 2025-01-08 PROCEDURE — 76642 ULTRASOUND BREAST LIMITED: CPT

## 2025-01-08 PROCEDURE — 77066 DX MAMMO INCL CAD BI: CPT

## 2025-01-08 PROCEDURE — G0279 TOMOSYNTHESIS, MAMMO: HCPCS

## (undated) DEVICE — 39" SINGLE PATIENT USE HOVERMATT: Brand: SINGLE PATIENT USE HOVERMATT

## (undated) DEVICE — GLV SURG BIOGEL LTX PF 6

## (undated) DEVICE — SUT PLAIN  3/0 CT1 27IN 842H

## (undated) DEVICE — SUT PDS 0 CTX 36IN DYED Z370T

## (undated) DEVICE — SUT MONOCRYL PLS ANTIB UND 3/0  PS1 27IN

## (undated) DEVICE — SOL IRR H2O BTL 1000ML STRL

## (undated) DEVICE — TRY SPINE BLCK WHITACRE 25G 3X5IN

## (undated) DEVICE — ADHS LIQ MASTISOL 2/3ML

## (undated) DEVICE — MAT PREVALON MOBL TRANSFR AIR W/PAD 39X80IN

## (undated) DEVICE — SUT PDS 1 TP1 48IN Z880G BX/12

## (undated) DEVICE — GLV SURG BIOGEL LTX PF 6 1/2

## (undated) DEVICE — SUT GUT CHRM 1 CTX 36IN 905H

## (undated) DEVICE — SUT MONOCRYL 0 MO4 CR8 27IN YY71G ETYY71G

## (undated) DEVICE — STPLR SKIN SUBCUTICULAR INSORB 2030

## (undated) DEVICE — SUT MNCRYL 3/0 27L Y523H BX/36

## (undated) DEVICE — SKIN AFFIX SURG ADHESIVE 72/CS 0.55ML: Brand: MEDLINE

## (undated) DEVICE — GLV SURG BIOGEL LTX PF 8 1/2

## (undated) DEVICE — SOL IRR NACL 0.9PCT BT 1000ML

## (undated) DEVICE — PK C/SECT 10

## (undated) DEVICE — 3M™ STERI-STRIP™ BLEND TONE SKIN CLOSURES, B1557, TAN, 1/2 IN X 4 IN (12MM X 100MM), 6 STRIPS/ENVELOPE: Brand: 3M™ STERI-STRIP™

## (undated) DEVICE — SUT GUT CHRM 2/0 CT1 27IN 811H